# Patient Record
Sex: MALE | Race: WHITE | NOT HISPANIC OR LATINO | Employment: UNEMPLOYED | URBAN - METROPOLITAN AREA
[De-identification: names, ages, dates, MRNs, and addresses within clinical notes are randomized per-mention and may not be internally consistent; named-entity substitution may affect disease eponyms.]

---

## 2017-04-25 ENCOUNTER — COMMUNICATION - RIVER FALLS (OUTPATIENT)
Dept: FAMILY MEDICINE | Facility: CLINIC | Age: 57
End: 2017-04-25

## 2017-04-25 ENCOUNTER — OFFICE VISIT - RIVER FALLS (OUTPATIENT)
Dept: FAMILY MEDICINE | Facility: CLINIC | Age: 57
End: 2017-04-25

## 2017-04-25 ASSESSMENT — MIFFLIN-ST. JEOR: SCORE: 1680.36

## 2017-04-26 LAB
CREAT SERPL-MCNC: 1.04 MG/DL (ref 0.7–1.33)
GLUCOSE BLD-MCNC: 102 MG/DL (ref 65–99)

## 2017-06-06 ENCOUNTER — OFFICE VISIT - RIVER FALLS (OUTPATIENT)
Dept: FAMILY MEDICINE | Facility: CLINIC | Age: 57
End: 2017-06-06

## 2017-06-13 ENCOUNTER — OFFICE VISIT - RIVER FALLS (OUTPATIENT)
Dept: FAMILY MEDICINE | Facility: CLINIC | Age: 57
End: 2017-06-13

## 2017-06-13 ASSESSMENT — MIFFLIN-ST. JEOR: SCORE: 1680.36

## 2018-09-12 ENCOUNTER — OFFICE VISIT - RIVER FALLS (OUTPATIENT)
Dept: FAMILY MEDICINE | Facility: CLINIC | Age: 58
End: 2018-09-12

## 2018-09-12 ENCOUNTER — COMMUNICATION - RIVER FALLS (OUTPATIENT)
Dept: FAMILY MEDICINE | Facility: CLINIC | Age: 58
End: 2018-09-12

## 2018-09-12 ASSESSMENT — MIFFLIN-ST. JEOR: SCORE: 1699.41

## 2018-09-13 LAB
CHOLEST SERPL-MCNC: 237 MG/DL
CHOLEST/HDLC SERPL: 4.9 {RATIO}
CREAT SERPL-MCNC: 0.92 MG/DL (ref 0.7–1.33)
GLUCOSE BLD-MCNC: 108 MG/DL (ref 65–99)
HDLC SERPL-MCNC: 48 MG/DL
LDLC SERPL CALC-MCNC: 161 MG/DL
NONHDLC SERPL-MCNC: 189 MG/DL
TRIGL SERPL-MCNC: 146 MG/DL

## 2018-09-21 ENCOUNTER — OFFICE VISIT - RIVER FALLS (OUTPATIENT)
Dept: FAMILY MEDICINE | Facility: CLINIC | Age: 58
End: 2018-09-21

## 2019-02-05 ENCOUNTER — AMBULATORY - RIVER FALLS (OUTPATIENT)
Dept: FAMILY MEDICINE | Facility: CLINIC | Age: 59
End: 2019-02-05

## 2019-08-09 ENCOUNTER — HOSPITAL ENCOUNTER (INPATIENT)
Facility: CLINIC | Age: 59
LOS: 12 days | Discharge: PSYCHIATRIC OR SUBSTANCE USE FACILITY | End: 2019-08-21
Attending: PSYCHIATRY & NEUROLOGY | Admitting: PSYCHIATRY & NEUROLOGY
Payer: MEDICAID

## 2019-08-09 DIAGNOSIS — E56.9 VITAMIN DEFICIENCY: ICD-10-CM

## 2019-08-09 DIAGNOSIS — F33.2 SEVERE EPISODE OF RECURRENT MAJOR DEPRESSIVE DISORDER, WITHOUT PSYCHOTIC FEATURES (H): ICD-10-CM

## 2019-08-09 DIAGNOSIS — F17.200 NICOTINE DEPENDENCE, UNCOMPLICATED, UNSPECIFIED NICOTINE PRODUCT TYPE: ICD-10-CM

## 2019-08-09 DIAGNOSIS — F32.3 CURRENT SEVERE EPISODE OF MAJOR DEPRESSIVE DISORDER WITH PSYCHOTIC FEATURES, UNSPECIFIED WHETHER RECURRENT (H): ICD-10-CM

## 2019-08-09 DIAGNOSIS — K21.9 GASTROESOPHAGEAL REFLUX DISEASE, ESOPHAGITIS PRESENCE NOT SPECIFIED: Primary | ICD-10-CM

## 2019-08-09 DIAGNOSIS — F10.20 UNCOMPLICATED ALCOHOL DEPENDENCE (H): ICD-10-CM

## 2019-08-09 PROBLEM — F32.A DEPRESSION: Status: ACTIVE | Noted: 2019-08-09

## 2019-08-09 LAB
AMPHETAMINES UR QL SCN: NEGATIVE
BARBITURATES UR QL: NEGATIVE
BENZODIAZ UR QL: NEGATIVE
CANNABINOIDS UR QL SCN: NEGATIVE
COCAINE UR QL: NEGATIVE
ETHANOL UR QL SCN: NEGATIVE
OPIATES UR QL SCN: NEGATIVE

## 2019-08-09 PROCEDURE — 99285 EMERGENCY DEPT VISIT HI MDM: CPT | Mod: Z6 | Performed by: PSYCHIATRY & NEUROLOGY

## 2019-08-09 PROCEDURE — 99285 EMERGENCY DEPT VISIT HI MDM: CPT | Mod: 25 | Performed by: PSYCHIATRY & NEUROLOGY

## 2019-08-09 PROCEDURE — 80307 DRUG TEST PRSMV CHEM ANLYZR: CPT | Performed by: PSYCHIATRY & NEUROLOGY

## 2019-08-09 PROCEDURE — 25000132 ZZH RX MED GY IP 250 OP 250 PS 637: Performed by: EMERGENCY MEDICINE

## 2019-08-09 PROCEDURE — 12400002 ZZH R&B MH SENIOR/ADOLESCENT

## 2019-08-09 PROCEDURE — 80320 DRUG SCREEN QUANTALCOHOLS: CPT | Performed by: PSYCHIATRY & NEUROLOGY

## 2019-08-09 PROCEDURE — 90791 PSYCH DIAGNOSTIC EVALUATION: CPT

## 2019-08-09 RX ORDER — CITALOPRAM HYDROBROMIDE 40 MG/1
40 TABLET ORAL DAILY
Status: DISCONTINUED | OUTPATIENT
Start: 2019-08-10 | End: 2019-08-21 | Stop reason: HOSPADM

## 2019-08-09 RX ORDER — QUETIAPINE FUMARATE 50 MG/1
50 TABLET, FILM COATED ORAL 2 TIMES DAILY
Status: DISCONTINUED | OUTPATIENT
Start: 2019-08-10 | End: 2019-08-10

## 2019-08-09 RX ORDER — MIRTAZAPINE 15 MG/1
15 TABLET, FILM COATED ORAL AT BEDTIME
Status: DISCONTINUED | OUTPATIENT
Start: 2019-08-10 | End: 2019-08-12

## 2019-08-09 RX ORDER — QUETIAPINE FUMARATE 50 MG/1
50 TABLET, FILM COATED ORAL 2 TIMES DAILY
Status: ON HOLD | COMMUNITY
End: 2019-08-19

## 2019-08-09 RX ORDER — CITALOPRAM HYDROBROMIDE 10 MG/1
40 TABLET ORAL DAILY
Status: ON HOLD | COMMUNITY
End: 2019-08-10

## 2019-08-09 RX ORDER — QUETIAPINE FUMARATE 100 MG/1
100 TABLET, FILM COATED ORAL DAILY
Status: DISCONTINUED | OUTPATIENT
Start: 2019-08-10 | End: 2019-08-10

## 2019-08-09 RX ORDER — QUETIAPINE FUMARATE 100 MG/1
100 TABLET, FILM COATED ORAL ONCE
Status: COMPLETED | OUTPATIENT
Start: 2019-08-09 | End: 2019-08-09

## 2019-08-09 RX ORDER — NICOTINE 21 MG/24HR
1 PATCH, TRANSDERMAL 24 HOURS TRANSDERMAL DAILY
Status: DISCONTINUED | OUTPATIENT
Start: 2019-08-10 | End: 2019-08-21 | Stop reason: HOSPADM

## 2019-08-09 RX ORDER — QUETIAPINE FUMARATE 100 MG/1
100 TABLET, FILM COATED ORAL DAILY
Status: ON HOLD | COMMUNITY
End: 2019-08-19

## 2019-08-09 RX ORDER — HYDROXYZINE HYDROCHLORIDE 25 MG/1
25 TABLET, FILM COATED ORAL EVERY 4 HOURS PRN
Status: DISCONTINUED | OUTPATIENT
Start: 2019-08-09 | End: 2019-08-21 | Stop reason: HOSPADM

## 2019-08-09 RX ORDER — MIRTAZAPINE 15 MG/1
15 TABLET, FILM COATED ORAL AT BEDTIME
COMMUNITY

## 2019-08-09 RX ADMIN — QUETIAPINE FUMARATE 100 MG: 100 TABLET ORAL at 13:27

## 2019-08-09 ASSESSMENT — ACTIVITIES OF DAILY LIVING (ADL)
DRESS: INDEPENDENT
ORAL_HYGIENE: INDEPENDENT
HYGIENE/GROOMING: INDEPENDENT

## 2019-08-09 ASSESSMENT — ENCOUNTER SYMPTOMS
NERVOUS/ANXIOUS: 1
FEVER: 0
ABDOMINAL PAIN: 0
HALLUCINATIONS: 1
SHORTNESS OF BREATH: 0
DYSPHORIC MOOD: 1

## 2019-08-09 ASSESSMENT — MIFFLIN-ST. JEOR: SCORE: 1688.55

## 2019-08-09 NOTE — ED NOTES
I have performed an in person assessment of the patient. Based on this assessment the patient no longer requires a one on one attendant at this point in time.    Justin Munoz,   1:25 PM  August 9, 2019         Justin Munoz,   08/09/19 [id number]

## 2019-08-09 NOTE — ED PROVIDER NOTES
History     Chief Complaint   Patient presents with     Suicidal     Pt states that he is suicidal. He was at [facility] for alcohol. They sent him here by cab. Pt states that he feels like spiders and mice have been crawling on him for over a month     The history is provided by the patient and medical records.     Aldo Vernon is a 58 year old male who comes in due to his worsening mood and hallucinations.  He has a history of alcohol dependence. He has not used since 7/17/19 when he was hospitalized at [facility]. He was there for 17 days and then went to [facility] for CD treatment. He has stayed sober during this time.  He states he is feeling bugs crawl on him.  This is a chronic hallucination. He states he feels hopeless, helpless and worthless.  He has suicidal thoughts to overdose on alcohol/drugs or [place].  He does not feel safe. He is tearful.  He also states that he does not like [facility] and wants a different place to go. He thinks he needs an IRTS.  In the past, he has been to the ED, hospitalized and CD treatment programs multiple times.      Please see the 's assessment in EPIC from today (8/9/19) for further details.    I have reviewed the Medications, Allergies, Past Medical and Surgical History, and Social History in the Epic system.    Review of Systems   Constitutional: Negative for fever.   Respiratory: Negative for shortness of breath.    Cardiovascular: Negative for chest pain.   Gastrointestinal: Negative for abdominal pain.   Psychiatric/Behavioral: Positive for dysphoric mood, hallucinations and suicidal ideas. Negative for self-injury. The patient is nervous/anxious.    All other systems reviewed and are negative.      Physical Exam   BP: (!) 144/84  Pulse: 67  Temp: 98.7  F (37.1  C)  Resp: 16  Weight: 89.4 kg (197 lb)  SpO2: 97 %      Physical Exam   Constitutional: He appears well-developed and well-nourished.   Cardiovascular: Normal rate,  regular rhythm and normal heart sounds.   Pulmonary/Chest: Effort normal and breath sounds normal. No respiratory distress.   Psychiatric: His speech is normal and behavior is normal. Judgment normal. His mood appears anxious. He is actively hallucinating. Thought content is not paranoid and not delusional. Cognition and memory are normal. He exhibits a depressed mood. He expresses suicidal ideation. He expresses no homicidal ideation. He expresses suicidal plans. He expresses no homicidal plans.   Aldo is a 59 y/o male who looks his age. He is well groomed with good eye contact.     Nursing note and vitals reviewed.      ED Course        Procedures               Labs Ordered and Resulted from Time of ED Arrival Up to the Time of Departure from the ED   DRUG ABUSE SCREEN 6 CHEM DEP URINE ([facility])            Assessments & Plan (with Medical Decision Making)   Aldo will be admitted to the hospital due to his worsening depression, hopelessness, hallucinations, suicidal thoughts with a plan and the inability to be safe.  There are some red flags of some secondary gain due to him not liking where he is staying and his history of multiple hospitalizations.  At this point, there is no documentation of this, so he will be given the benefit of the doubt.  He will go to station 3b under Dr. Kerns.    I have reviewed the nursing notes.    I have reviewed the findings, diagnosis, plan and need for follow up with the patient.    New Prescriptions    No medications on file       Final diagnoses:   Current severe episode of major depressive disorder with psychotic features, unspecified whether recurrent (H)   Uncomplicated alcohol dependence (H)       8/9/2019   [facility], [place], EMERGENCY DEPARTMENT     Jesus Mehta MD  08/09/19 1945
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
## 2019-08-09 NOTE — ED NOTES
ED to Behavioral Floor Handoff    SITUATION  Aldo Vernon is a 58 year old male who speaks English and lives in a shelter with others The patient arrived in the ED by cab from home with a complaint of Suicidal (Pt states that he is suicidal. He was at [facility] for alcohol. They sent him here by cab. Pt states that he feels like spiders and mice have been crawling on him for over a month)  .The patient's current symptoms started/worsened 1 month(s) ago and during this time the symptoms have increased.   In the ED, pt was diagnosed with   Final diagnoses:   Current severe episode of major depressive disorder with psychotic features, unspecified whether recurrent (H)   Uncomplicated alcohol dependence (H)        Initial vitals were: BP: (!) 144/84  Pulse: 67  Temp: 98.7  F (37.1  C)  Resp: 16  Weight: 89.4 kg (197 lb)  SpO2: 97 %   --------  Is the patient diabetic? No   If yes, last blood glucose? --     If yes, was this treated in the ED? --  --------  Is the patient inebriated (ETOH) No or Impaired on other substances? No  MSSA done? N/A  Last MSSA score: --    Were withdrawal symptoms treated? N/A  Does the patient have a seizure history? No. If yes, date of most recent seizure--  --------  Is the patient patient experiencing suicidal ideation? reports suicidal ideation with out intention or a suicidal plan    Homicidal ideation? denies current or recent homicidal ideation or behaviors.    Self-injurious behavior/urges? denies current or recent self injurious behavior or ideation.  ------  Was pt aggressive in the ED No  Was a code called No  Is the pt now cooperative? Yes  -------  Meds given in ED:   Medications   QUEtiapine (SEROquel) tablet 100 mg (100 mg Oral Given 8/9/19 2230)      Family present during ED course? No  Family currently present? No    BACKGROUND  Does the patient have a cognitive impairment or developmental disability? No  Allergies: No Known Allergies.   Social demographics are    Social History     Socioeconomic History     Marital status:      Spouse name: None     Number of children: None     Years of education: None     Highest education level: None   Occupational History     None   Social Needs     Financial resource strain: None     Food insecurity:     Worry: None     Inability: None     Transportation needs:     Medical: None     Non-medical: None   Tobacco Use     Smoking status: Current Every Day Smoker     Smokeless tobacco: Never Used   Substance and Sexual Activity     Alcohol use: Not Currently     Comment: last drink July 17     Drug use: Not Currently     Sexual activity: None   Lifestyle     Physical activity:     Days per week: None     Minutes per session: None     Stress: None   Relationships     Social connections:     Talks on phone: None     Gets together: None     Attends Holiness service: None     Active member of club or organization: None     Attends meetings of clubs or organizations: None     Relationship status: None     Intimate partner violence:     Fear of current or ex partner: None     Emotionally abused: None     Physically abused: None     Forced sexual activity: None   Other Topics Concern     None   Social History Narrative     None        ASSESSMENT  Labs results   Labs Ordered and Resulted from Time of ED Arrival Up to the Time of Departure from the ED   DRUG ABUSE SCREEN 6 CHEM DEP URINE ([facility])      Imaging Studies: No results found for this or any previous visit (from the past 24 hour(s)).   Most recent vital signs BP (!) 144/84   Pulse 67   Temp 98.7  F (37.1  C) (Oral)   Resp 16   Wt 89.4 kg (197 lb)   SpO2 97%    Abnormal labs/tests/findings requiring intervention:---   Pain control: good  Nausea control: good    RECOMMENDATION  Are any infection precautions needed (MRSA, VRE, etc.)? No If yes, what infection? --  ---  Does the patient have mobility issues? independently. If yes, what device does the pt use? ---  ---  Is patient on  72 hour hold or commitment? No If on 72 hour hold, have hold and rights been given to patient? N/A  Are admitting orders written if after 10 p.m. ?N/A  Tasks needing to be completed:---     Khloe pryor-- [phone] [facility]   [phone] [facility]

## 2019-08-10 LAB
ALBUMIN SERPL-MCNC: 3.9 G/DL (ref 3.4–5)
ALP SERPL-CCNC: 87 U/L (ref 40–150)
ALT SERPL W P-5'-P-CCNC: 72 U/L (ref 0–70)
ANION GAP SERPL CALCULATED.3IONS-SCNC: 8 MMOL/L (ref 3–14)
AST SERPL W P-5'-P-CCNC: 28 U/L (ref 0–45)
BILIRUB SERPL-MCNC: 0.7 MG/DL (ref 0.2–1.3)
BUN SERPL-MCNC: 14 MG/DL (ref 7–30)
CALCIUM SERPL-MCNC: 9 MG/DL (ref 8.5–10.1)
CHLORIDE SERPL-SCNC: 109 MMOL/L (ref 94–109)
CO2 SERPL-SCNC: 22 MMOL/L (ref 20–32)
CREAT SERPL-MCNC: 0.88 MG/DL (ref 0.66–1.25)
ERYTHROCYTE [DISTWIDTH] IN BLOOD BY AUTOMATED COUNT: 12.2 % (ref 10–15)
GFR SERPL CREATININE-BSD FRML MDRD: >90 ML/MIN/{1.73_M2}
GLUCOSE SERPL-MCNC: 103 MG/DL (ref 70–99)
HCT VFR BLD AUTO: 44.8 % (ref 40–53)
HGB BLD-MCNC: 15.3 G/DL (ref 13.3–17.7)
MCH RBC QN AUTO: 31.7 PG (ref 26.5–33)
MCHC RBC AUTO-ENTMCNC: 34.2 G/DL (ref 31.5–36.5)
MCV RBC AUTO: 93 FL (ref 78–100)
PLATELET # BLD AUTO: 287 10E9/L (ref 150–450)
POTASSIUM SERPL-SCNC: 4.1 MMOL/L (ref 3.4–5.3)
PROT SERPL-MCNC: 7.5 G/DL (ref 6.8–8.8)
RBC # BLD AUTO: 4.83 10E12/L (ref 4.4–5.9)
SODIUM SERPL-SCNC: 139 MMOL/L (ref 133–144)
WBC # BLD AUTO: 5.8 10E9/L (ref 4–11)

## 2019-08-10 PROCEDURE — 99222 1ST HOSP IP/OBS MODERATE 55: CPT | Mod: AI | Performed by: PSYCHIATRY & NEUROLOGY

## 2019-08-10 PROCEDURE — 99222 1ST HOSP IP/OBS MODERATE 55: CPT | Performed by: PHYSICIAN ASSISTANT

## 2019-08-10 PROCEDURE — 12400002 ZZH R&B MH SENIOR/ADOLESCENT

## 2019-08-10 PROCEDURE — 25000132 ZZH RX MED GY IP 250 OP 250 PS 637: Performed by: PSYCHIATRY & NEUROLOGY

## 2019-08-10 PROCEDURE — 80053 COMPREHEN METABOLIC PANEL: CPT | Performed by: PHYSICIAN ASSISTANT

## 2019-08-10 PROCEDURE — 85027 COMPLETE CBC AUTOMATED: CPT | Performed by: PHYSICIAN ASSISTANT

## 2019-08-10 PROCEDURE — 36415 COLL VENOUS BLD VENIPUNCTURE: CPT | Performed by: PHYSICIAN ASSISTANT

## 2019-08-10 PROCEDURE — 99207 ZZC CONSULT E&M CHANGED TO INITIAL LEVEL: CPT | Performed by: PHYSICIAN ASSISTANT

## 2019-08-10 RX ORDER — ACETAMINOPHEN 325 MG/1
650 TABLET ORAL EVERY 4 HOURS PRN
Status: DISCONTINUED | OUTPATIENT
Start: 2019-08-10 | End: 2019-08-21 | Stop reason: HOSPADM

## 2019-08-10 RX ORDER — CITALOPRAM HYDROBROMIDE 40 MG/1
40 TABLET ORAL EVERY MORNING
Status: ON HOLD | COMMUNITY
End: 2019-08-19

## 2019-08-10 RX ORDER — QUETIAPINE FUMARATE 100 MG/1
100 TABLET, FILM COATED ORAL 3 TIMES DAILY
Status: DISCONTINUED | OUTPATIENT
Start: 2019-08-10 | End: 2019-08-13

## 2019-08-10 RX ADMIN — CITALOPRAM HYDROBROMIDE 40 MG: 40 TABLET ORAL at 08:57

## 2019-08-10 RX ADMIN — MIRTAZAPINE 15 MG: 15 TABLET, FILM COATED ORAL at 20:27

## 2019-08-10 RX ADMIN — QUETIAPINE FUMARATE 50 MG: 50 TABLET ORAL at 00:01

## 2019-08-10 RX ADMIN — QUETIAPINE FUMARATE 100 MG: 100 TABLET ORAL at 19:50

## 2019-08-10 RX ADMIN — QUETIAPINE FUMARATE 100 MG: 100 TABLET ORAL at 13:34

## 2019-08-10 RX ADMIN — MIRTAZAPINE 15 MG: 15 TABLET, FILM COATED ORAL at 00:00

## 2019-08-10 RX ADMIN — ACETAMINOPHEN 650 MG: 325 TABLET, FILM COATED ORAL at 12:26

## 2019-08-10 RX ADMIN — HYDROXYZINE HYDROCHLORIDE 25 MG: 25 TABLET ORAL at 10:56

## 2019-08-10 RX ADMIN — MELATONIN 2000 UNITS: at 08:57

## 2019-08-10 RX ADMIN — NICOTINE 1 PATCH: 21 PATCH, EXTENDED RELEASE TRANSDERMAL at 08:57

## 2019-08-10 RX ADMIN — QUETIAPINE FUMARATE 50 MG: 50 TABLET ORAL at 08:58

## 2019-08-10 ASSESSMENT — ACTIVITIES OF DAILY LIVING (ADL)
ORAL_HYGIENE: INDEPENDENT
ORAL_HYGIENE: INDEPENDENT
AMBULATION: 0-->INDEPENDENT
BATHING: 0-->INDEPENDENT
SWALLOWING: 0-->SWALLOWS FOODS/LIQUIDS WITHOUT DIFFICULTY
COGNITION: 0 - NO COGNITION ISSUES REPORTED
RETIRED_EATING: 0-->INDEPENDENT
TRANSFERRING: 0-->INDEPENDENT
DRESS: INDEPENDENT
DRESS: 0-->INDEPENDENT
NUMBER_OF_TIMES_PATIENT_HAS_FALLEN_WITHIN_LAST_SIX_MONTHS: 1
FALL_HISTORY_WITHIN_LAST_SIX_MONTHS: YES
RETIRED_COMMUNICATION: 0-->UNDERSTANDS/COMMUNICATES WITHOUT DIFFICULTY
LAUNDRY: WITH SUPERVISION
TOILETING: 0-->INDEPENDENT
HYGIENE/GROOMING: INDEPENDENT
DRESS: INDEPENDENT
HYGIENE/GROOMING: INDEPENDENT

## 2019-08-10 NOTE — PHARMACY-ADMISSION MEDICATION HISTORY
Admission medication history for the August 10, 2019 admission is complete.     Interview sources:  Patient, Care Everywhere - [facility]    Reliability of source: Good - patient was a reliable historian of his medications stating medication name, dosing instructions, and approximate last dose of his medications.      Medication compliance: Good - patient reports taking his medications daily as directed.  Per patient he misses approximately 2-3 dose of medication per month.     Changes made to PTA medication list (reason)  Added: None.     Deleted: None.     Changed: Per patient and Care Everywhere, the following medication have been updated to include changes in formulation:  Citalopram 10 mg tablet -- take 40 mg by mouth daily -------changed to----> Citalopram 40 mg tablet -- take 1 tablet (40 mg) by mouth every morning.     Additional medication history information:   This medication history was completed at the patients bedside on 3B.  Patient reports no known food or medication allergies.  Patient shared he currently smokes 1/2 pack of cigarettes daily at home.  Patient would like access to nicotine patches during his inpatient stay, but shared he would prefer not to receive nicotine gum or lozenges due to taste.     --For the mirtazapine, patient reports taking 30 mg at bedtime rather than 15 mg as directed.   Unable to verify dose increase from 15 mg to 30 mg in patient chart, dispense history, or with Care Everywhere.     --For the ranitidine, is currently taking ranitidine PRN for heartburn symptoms.  Per patient, he experiences symptoms of heartburn 3-4 times per week over night.  Patient is open to taking a medication daily for heartburn prevention.     The patient denies currently taking any additional prescription, OTC or herbal medications at home.     Prior to Admission medications    Medication Sig Last Dose Taking? Auth Provider   cholecalciferol (VITAMIN D3) 1000 units (25 mcg) capsule Take 2  capsules by mouth daily 8/9/2019 at AM Yes Reported, Patient   citalopram (CELEXA) 40 MG tablet Take 40 mg by mouth every morning 8/9/2019 at AM Yes Unknown, Entered By History   mirtazapine (REMERON) 15 MG tablet Take 15 mg by mouth At Bedtime Past Week at Unknown time Yes Reported, Patient   QUEtiapine (SEROQUEL) 100 MG tablet Take 100 mg by mouth daily at noon. 8/9/2019 at 1200 Yes Reported, Patient   QUEtiapine (SEROQUEL) 50 MG tablet Take 50 mg by mouth 2 times daily after breakfast and dinner. 8/9/2019 at AM Yes Reported, Patient   ranitidine (ZANTAC) 150 MG tablet Take 150 mg by mouth 2 times daily as needed for heartburn Past Week at PRN Yes Reported, Patient       Time spent: 30 minutes    Medication history completed by: Heidi Garcia, Pharmacy Intern

## 2019-08-10 NOTE — PROGRESS NOTES
08/09/19 2242   Patient Belongings   Did you bring any home meds/supplements to the hospital?  Yes   Disposition of meds  Other (see comment)   Patient Belongings locker   Patient Belongings Put in Hospital Secure Location (Security or Locker, etc.) other (see comments)   Belongings Search Yes   Clothing Search Yes   Second Staff GENEVA Caraballo             For patient locker: one pair shoes, one belt, one pack cigs., one cell phone, one black bag, one large suitcase, one bible, assorted paperwork, one necklace, one glasses case with glassed, one phone , ear buds, one small metal case, one pair cowboy boots, assorted toiltries, one bollow,   To security: [id number] one social security card, one [place] drivers license, two master card, one visa, one [place] EBT card.  Admission:  I am responsible for any personal items that are not sent to the safe or pharmacy.  [facility] is not responsible for loss, theft or damage of any property in my possession.    Signature:  _________________________________ Date: _______  Time: _____                                              Staff Signature:  ____________________________ Date: ________  Time: _____      2nd Staff person, if patient is unable/unwilling to sign:    Signature: ________________________________ Date: ________  Time: _____     Discharge:  [facility] has returned all of my personal belongings:    Signature: _________________________________ Date: ________  Time: _____                                          Staff Signature:  ____________________________ Date: ________  Time: _____
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
## 2019-08-10 NOTE — PROGRESS NOTES
"Approached  pt to complete admission profile, pt politely declined to be interviewed stating that he was tired and has waited 11 hours in the ED, \" I think it would be fair to let me rest. I will try not to be a pain in the ass\".  Med compliant.  "
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
## 2019-08-10 NOTE — PROGRESS NOTES
Visited with pt on the basis of hospital  requested for spiritual support of pt.  Reflected with pt around his hospital experience, sources of spiritual and emotional support and current spiritual health needs. Pt talked about his current situation and what it means for him. This admission I offered prayer, healing and blessings for the pt.    Emotional support. Reflective conversation integrating illness elements and family spiritual narratives.  I shared conversation that would invite God into the room and to bless him, support him in his suffering. I provided special prayer asking God to help and ease and eliminate any suffering and pain that the pt feels.    Pt received spiritual support and reflective conversation in the context of this hospitalization.  Pt expressed appreciation for the visit.    I will inform unit  for follow-up for spiritual support.
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
## 2019-08-10 NOTE — PROGRESS NOTES
Admit from the [facility] .  Oriented, calm, sad.  Rates depression and anxiety at 10/10 and still has suicidal thoughts with plan of car accident outside the hospital but no intent while in the hospital.  States that his appetite has been OK.  Fall history in the past 6 months.    P:  Continue to monitor.
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
## 2019-08-11 PROCEDURE — 25000132 ZZH RX MED GY IP 250 OP 250 PS 637: Performed by: PSYCHIATRY & NEUROLOGY

## 2019-08-11 PROCEDURE — 12400002 ZZH R&B MH SENIOR/ADOLESCENT

## 2019-08-11 RX ADMIN — CITALOPRAM HYDROBROMIDE 40 MG: 40 TABLET ORAL at 08:55

## 2019-08-11 RX ADMIN — QUETIAPINE FUMARATE 100 MG: 100 TABLET ORAL at 20:04

## 2019-08-11 RX ADMIN — MELATONIN 2000 UNITS: at 08:55

## 2019-08-11 RX ADMIN — IBUPROFEN 600 MG: 400 TABLET ORAL at 08:55

## 2019-08-11 RX ADMIN — QUETIAPINE FUMARATE 100 MG: 100 TABLET ORAL at 08:55

## 2019-08-11 RX ADMIN — NICOTINE 1 PATCH: 21 PATCH, EXTENDED RELEASE TRANSDERMAL at 08:57

## 2019-08-11 RX ADMIN — QUETIAPINE FUMARATE 100 MG: 100 TABLET ORAL at 14:32

## 2019-08-11 RX ADMIN — ACETAMINOPHEN 650 MG: 325 TABLET, FILM COATED ORAL at 10:16

## 2019-08-11 RX ADMIN — MIRTAZAPINE 15 MG: 15 TABLET, FILM COATED ORAL at 20:04

## 2019-08-11 ASSESSMENT — ACTIVITIES OF DAILY LIVING (ADL)
DRESS: SCRUBS (BEHAVIORAL HEALTH);INDEPENDENT
ORAL_HYGIENE: INDEPENDENT
HYGIENE/GROOMING: INDEPENDENT
ORAL_HYGIENE: INDEPENDENT
DRESS: SCRUBS (BEHAVIORAL HEALTH);STREET CLOTHES
HYGIENE/GROOMING: INDEPENDENT
LAUNDRY: WITH SUPERVISION

## 2019-08-11 NOTE — PROGRESS NOTES
Rates depression and anxiety at 8.5/10 today.  Still appears very sad.  Denies any tactile hallucinations tonight.  Visible in the lounge but does not interact with peers.

## 2019-08-11 NOTE — PLAN OF CARE
"48 hour nursing assessment  Problem: Depressive Symptoms  Goal: Depressive Symptoms  Description  Signs and symptoms of listed problems will be absent or manageable.  Outcome: No Change  Note:   Endorsed feeling anxious and depressed related to anticipating when he will receive divorce paperwork - he stated he would sometime this afternoon. Patient did not expand on this and stated, \"I'll talk to the doctor about it.\" Endorsed having back pain and utilized ibuprofen and tylenol with some relief.      Problem: Depressive Symptoms  Goal: Social and Therapeutic (Depression)  Description  Signs and symptoms of listed problems will be absent or manageable.  Outcome: No Change  Note:   Patient is visible in the milieu throughout the morning and was seen in groups. Withdrawn to himself.      Problem: Suicidal Behavior  Goal: Suicidal Behavior is Absent or Managed  Outcome: Declining  Note:   Affect is flat. Appears guarded in interaction. Poor eye contact with writer. Patient endorses having suicidal ideation with a plan, however does not tell writer and states, \"I'll talk to the doctor about it.\" He repeated this statement a couple times in writer's interaction with him. Writer informed patient that our goal is to keep him safe on the unit and that staff ask these questions as they are important to ask. He denied having any plan or intent to hurt or kill himself while on the unit/in the hospital. Listened actively to patient and encouraged patient to verbalize questions or concerns.    Addendum: Patient's wife came to the unit and patient agreed to have her on the unit and sign divorce paperwork. Writer offered to be present with patient, and he declined. He spends time out in the lounge/dining room afterwards.  "

## 2019-08-12 PROCEDURE — 25000132 ZZH RX MED GY IP 250 OP 250 PS 637: Performed by: PSYCHIATRY & NEUROLOGY

## 2019-08-12 PROCEDURE — G0177 OPPS/PHP; TRAIN & EDUC SERV: HCPCS

## 2019-08-12 PROCEDURE — 99232 SBSQ HOSP IP/OBS MODERATE 35: CPT | Performed by: PSYCHIATRY & NEUROLOGY

## 2019-08-12 PROCEDURE — 12400002 ZZH R&B MH SENIOR/ADOLESCENT

## 2019-08-12 RX ORDER — MIRTAZAPINE 30 MG/1
30 TABLET, FILM COATED ORAL AT BEDTIME
Status: DISCONTINUED | OUTPATIENT
Start: 2019-08-12 | End: 2019-08-21 | Stop reason: HOSPADM

## 2019-08-12 RX ADMIN — QUETIAPINE FUMARATE 100 MG: 100 TABLET ORAL at 13:22

## 2019-08-12 RX ADMIN — QUETIAPINE FUMARATE 100 MG: 100 TABLET ORAL at 20:11

## 2019-08-12 RX ADMIN — NICOTINE 1 PATCH: 21 PATCH, EXTENDED RELEASE TRANSDERMAL at 08:31

## 2019-08-12 RX ADMIN — MELATONIN 2000 UNITS: at 08:31

## 2019-08-12 RX ADMIN — CITALOPRAM HYDROBROMIDE 40 MG: 40 TABLET ORAL at 08:31

## 2019-08-12 RX ADMIN — QUETIAPINE FUMARATE 100 MG: 100 TABLET ORAL at 08:31

## 2019-08-12 RX ADMIN — MIRTAZAPINE 30 MG: 30 TABLET, FILM COATED ORAL at 20:11

## 2019-08-12 ASSESSMENT — ACTIVITIES OF DAILY LIVING (ADL)
HYGIENE/GROOMING: INDEPENDENT
DRESS: SCRUBS (BEHAVIORAL HEALTH)
ORAL_HYGIENE: INDEPENDENT
ORAL_HYGIENE: INDEPENDENT
DRESS: SCRUBS (BEHAVIORAL HEALTH)
LAUNDRY: WITH SUPERVISION
HYGIENE/GROOMING: INDEPENDENT

## 2019-08-12 NOTE — PROGRESS NOTES
08/11/19 2200   General Information   Art Directive other (see comments)   AT directive was to create an image of a safe place and to identify five items within safe place that represent each of the five senses. Goals of directive: trauma containment, emotional expression. Pt was a quiet participant, focused on task for the full duration of group. Pt finished drawing and briefly shared with group. Pt created an imaginary safe place inspired by a cabin he had once visited. Pt brandon an interior of a log cabin with a fireplace, chair, books and a dog. Pt described the five senses within safe place. Pt was observed with a depressed mood, flat affect, though brightened in conversation about art.

## 2019-08-12 NOTE — PROGRESS NOTES
Pt reports that he was at [facility] three weeks ago where a Rule 25 was completed.  Attempted to contact mental health inpatient unit to ask for pt's Rule 25. They sent me to medical records which repeated the same message circularly and did not allow writer opportunity to leave voice message.    Pt lacks insurance at this time so unable to refer to CD treatment or IRTS at this time.

## 2019-08-12 NOTE — PROGRESS NOTES
"   08/12/19 1400   Behavioral Health   Hallucinations tactile   Thinking poor concentration;distractable   Orientation person: oriented;place: oriented;date: oriented;time: oriented   Memory baseline memory   Insight poor   Judgement impaired   Eye Contact at examiner  (eyes closed at times)   Affect blunted, flat   Mood depressed;irritable   Physical Appearance/Attire disheveled   Hygiene other (see comment)  (adequate)   Suicidality thoughts only   1. Wish to be Dead Yes   2. Non-Specific Active Suicidal Thoughts  No   Self Injury other (see comment)  (pt denies)   Elopement   (none noted)   Activity withdrawn;other (see comment)  (present in groups and milieu)   Speech clear;coherent;other (see comments)  (minimal responses)   Medication Sensitivity no stated side effects;no observed side effects   Psychomotor / Gait balanced;steady;slow   Coping/Psychosocial   Verbalized Emotional State depression   Plan of Care Reviewed With patient   Patient Agreement with Plan of Care agrees   Psycho Education   Type of Intervention 1:1 intervention   Response participates with encouragement   Hours 0.5   Treatment Detail check in   Group Therapy Session   Group Attendance attended group session   Safety   Suicidality Status 15;Optimize communication / relationship to minimize opportunity for self-harm;Promote patient engagement with treatment process;Identify and strengthen protective factors   Activities of Daily Living   Hygiene/Grooming independent   Oral Hygiene independent   Dress scrubs (behavioral health)   Laundry with supervision   Room Organization independent   Activity   Activity Assistance Provided independent       Patient was in room laying down when staff approached for check in. Patient stated that he did not sleep well last night and has not been sleeping well before that so \"i'm catching up on my sleep\". Patient attended morning groups and has been present in the lounge, but withdrawn from conversation and " "activity with peers/staff. Patient affect blunted/flat, mood is irritable upon check in. Patient endorses tactile hallucinations of bugs crawling, but stated they come and go throughout the day. Patient also stated that he has been having SI, but would not act on it while in the hospital. Patient agreeable to come to staff and speak with them if thoughts intensify or he has feelings to act on those thoughts. When asked about his plan for this stay he stated \"I just want to get the hlep that I need.\" when asked what that would look like patient said \"I don't know\". Pt cooperative with check in, but appeared irritated that staff checked in with him at that time. Pt A&Ox4.   "

## 2019-08-12 NOTE — PROGRESS NOTES
Spoke with Afia from the business office as it appears as though pt has no insurance.  Afia reports that pt has no insurance.  Someone will come up to complete MA application for pt.    Pt reports that he would like to go to an IRTS or CD treatment.  Doesn't matter to him which one.

## 2019-08-12 NOTE — PLAN OF CARE
"Initial Note:     Patient participated in OT group this morning focused on communication, frustration management, and awareness. A structured group activity was used to promote cohesion and participation and provide concrete experiential examples to draw from. Patient was attentive and initiated contribution. He spoke softly. His contributions were on track although he mumbled some things. He participated independently and his inflection and tone became stronger and more confident as group went on. Patient participated in conclusion discussion with insightful comments and realization. Patient affect was flat with limited eye contact (improved as group went on). Patient ended group saying \"That was fun, thank you.\" No hygiene concerns.  "

## 2019-08-12 NOTE — PLAN OF CARE
BEHAVIORAL TEAM DISCUSSION    Participants: DUKE Benjamin, DUKE Umana, Lila Do RN, Aldo Kerns MD  Progress: Minimal orly admitted  Continued Stay Criteria/Rationale: Needs evaluation for CD, medication management  Medical/Physical: None  Precautions:   Behavioral Orders   Procedures    Code 1 - Restrict to Unit    Routine Programming     As clinically indicated    Status 15     Every 15 minutes.    Suicide precautions     Patients on Suicide Precautions should have a Combination Diet ordered that includes a Diet selection(s) AND a Behavioral Tray selection for Safe Tray - with utensils, or Safe Tray - NO utensils       Plan: Medication Management, CD consult, [facility] will work on aftercare planning.  Rationale for change in precautions or plan: None

## 2019-08-12 NOTE — PROGRESS NOTES
8/12/2019    CD consult acknowledged. Due to limited availability, there is no time frame on CD assessment at current time. Pt may not be seen until Thursday 08/14. Please follow up with Malu Vergara [phone] or Cynthia Rushing [phone]  if there are further concerns    SERGE Griffin

## 2019-08-12 NOTE — PROGRESS NOTES
"Pt refused check-in, appeared sad and irritable, but otherwise clean and tidy. Had spent more time in milieu and in groups than previous days. Immediately before bed, he apologized for his abrupt and brisk behavior, stating he just signed his divorce papers today. He said he did not want to get . During groups, patient is quoted as saying \"I'm glad I'm here, I feel safe here, but if I were outside I would try to kill myself.\"       08/11/19 2042   Behavioral Health   Hallucinations   (refused)   Thinking   (refused)   Orientation   (refused)   Memory   (refused)   Insight   (refused)   Judgement   (refused)   Eye Contact at examiner   Affect blunted, flat   Mood depressed;irritable   Physical Appearance/Attire attire appropriate to age and situation   Hygiene well groomed   Suicidality   (refused to comment)   1. Wish to be Dead   (refused)   2. Non-Specific Active Suicidal Thoughts    (refused)   Elopement   (no risk)   Activity withdrawn;other (see comment)  (more active than usual)   Psychomotor / Gait balanced;steady;foot dragging;slow   Psycho Education   Type of Intervention 1:1 intervention   Response refuses   Safety   Suicidality Status 15   Activities of Daily Living   Hygiene/Grooming independent   Oral Hygiene independent   Dress scrubs (behavioral health);street clothes   Laundry with supervision   Room Organization independent   Activity   Activity Assistance Provided independent     "

## 2019-08-12 NOTE — PLAN OF CARE
Reasons you are in the hospital:  1)  Suicidal ideation  2)  Panic attack    Goals for Discharge:  1)  Go to treatment or an IRTS.

## 2019-08-12 NOTE — PROGRESS NOTES
"[facility], [place]   Psychiatric Progress Note        Interim History:   The patient's care was discussed with the treatment team during the daily team meeting and/or staff's chart notes were reviewed.  Staff report patient is cooperative. Ate breakfast, took morning medications.     The patient reports that he has \"never been this low.\" Wife did come and serve him divorce papers. Says that his suicidal thoughts \"are powerful at night.\" Does feel \"very safe here\" but would not feel safe outside of the hospital. Didn't sleep that well. Says that the increase in Seroquel has been helpful for the visual hallucinations. Does report that he is usually on 30mg of Remeron. Eating well.          Medications:       citalopram  40 mg Oral Daily     mirtazapine  30 mg Oral At Bedtime     nicotine  1 patch Transdermal Daily     nicotine   Transdermal Q8H     nicotine   Transdermal Daily     QUEtiapine  100 mg Oral TID     vitamin D3  2,000 Units Oral Daily          Allergies:   No Known Allergies       Labs:   No results found for this or any previous visit (from the past 24 hour(s)).       Psychiatric Examination:     /68   Pulse 67   Temp 97  F (36.1  C) (Oral)   Resp 16   Ht 1.727 m (5' 8\")   Wt 88.9 kg (196 lb)   SpO2 97%   BMI 29.80 kg/m    Weight is 196 lbs 0 oz  Body mass index is 29.8 kg/m .  Orthostatic Vitals       Most Recent      Sitting Orthostatic /54 08/11 1635    Sitting Orthostatic Pulse (bpm) 55 08/11 1635    Standing Orthostatic /69 08/12 0803    Standing Orthostatic Pulse (bpm) 76 08/12 0803            Appearance: awake, alert and adequately groomed  Attitude:  cooperative  Eye Contact:  good  Mood:  depressed  Affect:  mood congruent  Speech:  clear, coherent  Psychomotor Behavior:  no evidence of tardive dyskinesia, dystonia, or tics  Thought Process:  goal oriented  Associations:  no loose associations  Thought Content:  passive suicidal ideation " present, visual hallucinations present and improved  Insight:  fair  Judgement:  fair  Oriented to:  time, person, and place  Attention Span and Concentration:  intact  Recent and Remote Memory:  fair    Clinical Global Impressions  First:  Considering your total clinical experience with this particular patient population, how severe are the patient's symptoms at this time?: 7 (08/10/19 0649)  Compared to the patient's condition at the START of treatment, this patient's condition is:: 4 (08/10/19 0649)  Most recent:  Considering your total clinical experience with this particular patient population, how severe are the patient's symptoms at this time?: 7 (08/10/19 0649)  Compared to the patient's condition at the START of treatment, this patient's condition is:: 4 (08/10/19 0649)         Precautions:     Behavioral Orders   Procedures     Code 1 - Restrict to Unit     Routine Programming     As clinically indicated     Status 15     Every 15 minutes.     Suicide precautions     Patients on Suicide Precautions should have a Combination Diet ordered that includes a Diet selection(s) AND a Behavioral Tray selection for Safe Tray - with utensils, or Safe Tray - NO utensils            Diagnoses:     Current severe episode of major depressive disorder with psychotic features, unspecified whether recurrent (H)  Uncomplicated alcohol dependence (H)          Assessment & Plan:      1) Continue increased Seroquel 100mg TID.   2) Continue Celexa.   3) Increase Remeron to 15mg at bedtime.   4) Patient open to CD treatment at [facility] or an IR facility. Will place CD consult.         Disposition Plan   Reason for ongoing admission: poses an imminent risk to self  Discharge location: Chemical dependency treatment facility  Discharge Medications: not ordered  Follow-up Appointments: not scheduled  Legal Status: voluntary  Entered by: Aldo Kerns on 8/12/2019 at 11:53 AM

## 2019-08-13 PROCEDURE — 25000132 ZZH RX MED GY IP 250 OP 250 PS 637: Performed by: PSYCHIATRY & NEUROLOGY

## 2019-08-13 PROCEDURE — 12400002 ZZH R&B MH SENIOR/ADOLESCENT

## 2019-08-13 PROCEDURE — 99232 SBSQ HOSP IP/OBS MODERATE 35: CPT | Performed by: PSYCHIATRY & NEUROLOGY

## 2019-08-13 PROCEDURE — H2032 ACTIVITY THERAPY, PER 15 MIN: HCPCS

## 2019-08-13 PROCEDURE — G0177 OPPS/PHP; TRAIN & EDUC SERV: HCPCS

## 2019-08-13 RX ADMIN — NICOTINE 1 PATCH: 21 PATCH, EXTENDED RELEASE TRANSDERMAL at 08:31

## 2019-08-13 RX ADMIN — MIRTAZAPINE 30 MG: 30 TABLET, FILM COATED ORAL at 20:28

## 2019-08-13 RX ADMIN — MELATONIN 2000 UNITS: at 08:31

## 2019-08-13 RX ADMIN — CITALOPRAM HYDROBROMIDE 40 MG: 40 TABLET ORAL at 08:31

## 2019-08-13 RX ADMIN — QUETIAPINE FUMARATE 125 MG: 100 TABLET ORAL at 13:14

## 2019-08-13 RX ADMIN — ACETAMINOPHEN 650 MG: 325 TABLET, FILM COATED ORAL at 08:33

## 2019-08-13 RX ADMIN — QUETIAPINE FUMARATE 100 MG: 100 TABLET ORAL at 08:31

## 2019-08-13 RX ADMIN — QUETIAPINE FUMARATE 125 MG: 100 TABLET ORAL at 20:28

## 2019-08-13 ASSESSMENT — ACTIVITIES OF DAILY LIVING (ADL)
ORAL_HYGIENE: INDEPENDENT
DRESS: INDEPENDENT
LAUNDRY: WITH SUPERVISION
ORAL_HYGIENE: INDEPENDENT
HYGIENE/GROOMING: INDEPENDENT
HYGIENE/GROOMING: INDEPENDENT
DRESS: INDEPENDENT

## 2019-08-13 ASSESSMENT — MIFFLIN-ST. JEOR: SCORE: 1684.01

## 2019-08-13 NOTE — PLAN OF CARE
"Patient appeared calm, social and attending group activities. He declined to answer mental health questions stating \"I will talk to my doctor in the morning\". Patient is medication complaint.   "
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
## 2019-08-13 NOTE — PROGRESS NOTES
"   08/13/19 1100   General Information   Date Initially Attended OT 08/13/19   Clinical Impression   Affect Flat;Appropriate to situation   Orientation Oriented to person, place and time   Appearance and ADLs General cleanliness observed in most areas   Attention to Internal Stimuli No observed signs   Interaction Skills Initiates appropriately with staff;Interacts appropriately with peers   Ability to Communicate Needs Independent   Verbal Content Clear;Appropriate to topic   Ability to Maintain Boundaries Maintains appropriate physical boundaries;Maintains appropriate verbal boundaries   Participation Initiates participation   Concentration Concentrates 50 minutes   Ability to Concentrate With structure;Needs further assessment   Follows and Comprehends Directions Independently follows 2 step verbal directions   Memory Delayed and immediate recall intact   Organization Independently organizes medium tasks   Decision Making Independent   Planning and Problem Solving Independently plans ahead;Needs further assessment   Ability to Apply and Learn Concepts Applies within group structure   Frustrations / Stress Tolerance Independently identifies sources of frustration/stress   Level of Insight Insightful into needs, issues, goals;Needs further assessment   Self Esteem Can identify positives   Social Supports Has knowledge of support systems   General Observation/Plan   General Observations/Plan See Comments   Attended 2 of 2 OT groups. He took the initiative in requesting work on a familiar step though creative task he needed to do some planning and organizing on with the details of the design. He took time to plan. He was pleasant and social with others. He stated reason for admission as \"I have mental illness\". The personal strength he identified as \"I am the nicest man I know\". Changes he hopes for at time of discharge was \"a future\". He participated in an activity focused on identifying helpful affirmations to use. " He offered a couple additional ideas and explained his thoughts clearly. OT goals he chose were to express feelings better, improve concentration, problem solving and ask for help as needed. Pt was given and completed a written self assessment. OT purpose was explained with the value of having involvement in treatment plan, and provided options to meet self identified goals. Plan:  Provide structure, support, and encouragement. Offer education on coping strategies and life management skills. Assist pt to increase self awareness regarding mental health issues and expand network of support resources. Assess further.

## 2019-08-13 NOTE — PROGRESS NOTES
Attended OT group focused on emotions identification and expression. Sullen/depressed mood and affect. Downcast eyes entire session. Superficial and guarded in his participation. Did indicate he was cautious with his current situation and in sharing with others.

## 2019-08-13 NOTE — PROGRESS NOTES
"Pt is calm and cooperative.  Affect is flat.  Lightly social with peers in the milieu.  Brief in his responses to writer - indicates he is having tactile hallucinations, passive SI, depression.  He declines to further elaborate stating, \"I will discuss with my doctor tomorrow.\"    "

## 2019-08-14 PROCEDURE — 12400002 ZZH R&B MH SENIOR/ADOLESCENT

## 2019-08-14 PROCEDURE — 25000132 ZZH RX MED GY IP 250 OP 250 PS 637: Performed by: PSYCHIATRY & NEUROLOGY

## 2019-08-14 PROCEDURE — G0177 OPPS/PHP; TRAIN & EDUC SERV: HCPCS

## 2019-08-14 PROCEDURE — 99232 SBSQ HOSP IP/OBS MODERATE 35: CPT | Performed by: PSYCHIATRY & NEUROLOGY

## 2019-08-14 RX ADMIN — NICOTINE 1 PATCH: 21 PATCH, EXTENDED RELEASE TRANSDERMAL at 08:13

## 2019-08-14 RX ADMIN — MIRTAZAPINE 30 MG: 30 TABLET, FILM COATED ORAL at 20:04

## 2019-08-14 RX ADMIN — QUETIAPINE FUMARATE 125 MG: 100 TABLET ORAL at 20:04

## 2019-08-14 RX ADMIN — QUETIAPINE FUMARATE 125 MG: 100 TABLET ORAL at 14:45

## 2019-08-14 RX ADMIN — CITALOPRAM HYDROBROMIDE 40 MG: 40 TABLET ORAL at 08:14

## 2019-08-14 RX ADMIN — MELATONIN 2000 UNITS: at 08:14

## 2019-08-14 RX ADMIN — QUETIAPINE FUMARATE 125 MG: 100 TABLET ORAL at 08:14

## 2019-08-14 ASSESSMENT — ACTIVITIES OF DAILY LIVING (ADL)
DRESS: INDEPENDENT
ORAL_HYGIENE: INDEPENDENT
DRESS: INDEPENDENT
HYGIENE/GROOMING: INDEPENDENT
HYGIENE/GROOMING: INDEPENDENT
LAUNDRY: WITH SUPERVISION
LAUNDRY: WITH SUPERVISION
ORAL_HYGIENE: INDEPENDENT

## 2019-08-14 NOTE — PROGRESS NOTES
Writer faxed patients records to the following IRTs programs; [facility], [facility], [facility], [facility], [facility], [facility], [facility] [place], [facility], [facility] and [facility].

## 2019-08-14 NOTE — PROGRESS NOTES
Behavioral Health  Note    Behavioral Health  Spirituality Group Note    UNIT 3BW    Name: Aldo Vernon YOB: 1960   MRN: [id number] Age: 58 year old      Patient attended -led group, which included discussion of spirituality, coping with illness and building resilience.    Patient attended group for 1.0 hrs.    The patient actively participated in group discussion and patient demonstrated an appreciation of topic's application for their personal circumstances.      Jenny Ivey MDiv, [facility]  Lead , Adult Behavioral Health  Pager [phone]

## 2019-08-14 NOTE — PROGRESS NOTES
08/13/19 2200   Therapeutic Recreation   Type of Intervention structured groups   Activity game   Response Participates, initiates socially appropriate   Hours 1     Pt actively participated in a structured Therapeutic Recreation group with a focus on leisure participation, stress reduction, and social engagement via a group game. Pt remained focused and engaged throughout full duration of group. Showed progress in session goals. Pt mood was calm and was appropriate with interactions.  Pt had a flat affect and talked in a soft, monotone voice, occasionally mumbled making it difficult to hear what was said.

## 2019-08-14 NOTE — PLAN OF CARE
Pt presented with calm mood, was social with other patients and attended group activities. Pt states he prefers to only talk to his MD regarding mental health symptoms. Pt is medication compliant.

## 2019-08-14 NOTE — PLAN OF CARE
Problem: OT General Care Plan  Goal: OT Goal 1  Description  Will develop insightful ideas for coping strategies and identify symptoms of when using them would be beneficial.    Note:   Attended 2 of 2 OT groups. He worked at a constant pace on a creative more abstract task. He was pleasant and social on approach. He participated in an activity requiring quick and again, creative answers. He offered multiple answers and appeared involved and invested in participating. Affect appears flat.

## 2019-08-14 NOTE — PROGRESS NOTES
Patient is visible in the milieu. He is tensed and irritable.  His affect is blunt and flat.. No aggressive behavior noted.  He is withdrawn. Patient sat quietly in the lounge while watching TV.  Patient speaks very briefly but refused to have 1:1 check-in.  So, writer not able to assess suicidal risk of the patient.  Patient is med compliant. He attended the group activity this evening.    He was visited by his wife early this evening bringing papers for him  to sign. Patient did not share what those papers were but they were a bunch which he signed.  His wife left immediately with the papers after the patient signed them.
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
## 2019-08-15 PROCEDURE — 12400002 ZZH R&B MH SENIOR/ADOLESCENT

## 2019-08-15 PROCEDURE — G0177 OPPS/PHP; TRAIN & EDUC SERV: HCPCS

## 2019-08-15 PROCEDURE — 99232 SBSQ HOSP IP/OBS MODERATE 35: CPT | Performed by: PSYCHIATRY & NEUROLOGY

## 2019-08-15 PROCEDURE — 25000132 ZZH RX MED GY IP 250 OP 250 PS 637: Performed by: PSYCHIATRY & NEUROLOGY

## 2019-08-15 PROCEDURE — 40000007 ZZH STATISTIC ADULT CD FACE TO FACE-NO CHRG

## 2019-08-15 PROCEDURE — H2032 ACTIVITY THERAPY, PER 15 MIN: HCPCS

## 2019-08-15 RX ADMIN — MELATONIN 2000 UNITS: at 08:52

## 2019-08-15 RX ADMIN — QUETIAPINE FUMARATE 125 MG: 100 TABLET ORAL at 14:15

## 2019-08-15 RX ADMIN — QUETIAPINE FUMARATE 125 MG: 100 TABLET ORAL at 08:52

## 2019-08-15 RX ADMIN — NICOTINE 1 PATCH: 21 PATCH, EXTENDED RELEASE TRANSDERMAL at 08:54

## 2019-08-15 RX ADMIN — QUETIAPINE FUMARATE 125 MG: 100 TABLET ORAL at 19:57

## 2019-08-15 RX ADMIN — MAGNESIUM HYDROXIDE 30 ML: 400 SUSPENSION ORAL at 21:02

## 2019-08-15 RX ADMIN — HYDROXYZINE HYDROCHLORIDE 25 MG: 25 TABLET ORAL at 14:15

## 2019-08-15 RX ADMIN — MIRTAZAPINE 30 MG: 30 TABLET, FILM COATED ORAL at 19:57

## 2019-08-15 RX ADMIN — CITALOPRAM HYDROBROMIDE 40 MG: 40 TABLET ORAL at 08:51

## 2019-08-15 ASSESSMENT — ACTIVITIES OF DAILY LIVING (ADL)
ORAL_HYGIENE: INDEPENDENT
ORAL_HYGIENE: INDEPENDENT
HYGIENE/GROOMING: INDEPENDENT
DRESS: INDEPENDENT;SCRUBS (BEHAVIORAL HEALTH)
HYGIENE/GROOMING: INDEPENDENT
DRESS: INDEPENDENT

## 2019-08-15 NOTE — PLAN OF CARE
"  Problem: OT General Care Plan  Goal: OT Goal 1  Description  Will develop insightful ideas for coping strategies and identify symptoms of when using them would be beneficial.    Note:   Attended 2 of 2 OT groups. He worked independently, quietly, planned and organized his work steps with success in attending to details. He is pleasant on approach and elaborates some with interactions. He participated in an activity requiring using visuospatial concepts with problem solving. As he participated and practiced with finding the solutions, he became quicker with identifying answers. He stated this to be challenging and successful in focusing on the activity completely instead of his problems \"and I have a lot of problems\".   "

## 2019-08-15 NOTE — PLAN OF CARE
"48 Hour Nursing Observation     Uncomplicated alcohol dependence (H) [F10.20]  Current severe episode of major depressive disorder with psychotic features, unspecified whether recurrent (H) [F32.3]    Admit Date: 8/9/2019    Length of Stay: 5    Patient evaluation continues. Assessed mood,anxiety,thoughts and behavior. Patient is progressing towards goals. Patient is encouraged to participate in groups and assisted to develop healthy coping skills.  Patient does not appear to be responding to auditory or visual hallucinations. /71   Pulse 73   Temp 98.1  F (36.7  C) (Tympanic)   Resp 16   Ht 1.727 m (5' 8\")   Wt 88.5 kg (195 lb)   SpO2 98%   BMI 29.65 kg/m      Mood: calm and cooperative      Depression and anxiety: JACOB, pt refuses to answer     Affect: bright and social    Sleep: adequate     Appetite: good    SI: JACOB     HI: JACOB    SIB: JACOB    Group participation: Attends and participates     ADL's: independent     Refer to daily team meeting notes for individualized plan of care. Nursing will continue to assess.    *Scale is 1-10 and 10 is the worst.         "
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
## 2019-08-15 NOTE — PROGRESS NOTES
"Pt has been up and involved. Pt is eating well at meals. Pt has been attending groups. Pt when asked how he was doing said \" Shitty.\" Pt explained that he is in process of a divorce. He has been  x 10 yrs. Pt reports he has thoughts of suicide but no plans. Later Pt approached this staff and whispered to this staff, \" There is Hope with that.\"   "
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
## 2019-08-15 NOTE — PROGRESS NOTES
Per MD progress note from today, pt is reporting visual hallucinations which are disturbing to him.  He will not be accepted to a CD or MICD treatment program while he is still experiencing hallucinations.  Writer supports a referral to an IRTS facility vs. CD treatment.  Pt can transition to CD treatment upon successful completion of an IRTS program.    Regarding CD evaluation and CD treatment: Per chart review, pt had a Rule 25 assessment 3 weeks ago by SERGE Pérez at [facility].  He was referred and accepted to [facility], [facility] and [facility].  Rule 25 assessments are valid for 45 days.  If pt is appropriate for CD treatment (not experiencing hallucinations), writer recommends unit contact intake coordinators at [facility], [facility], and [facility] to inquire about openings and wait lists.    Jermaine at [facility]  Phone: [phone]   Fax: [phone]    Aiden at [facility]  Phone: [phone]   Fax: [phone]    Beltran at [facility]  Phone: [phone]  Fax: [phone]    SERGE Spears  [phone]

## 2019-08-15 NOTE — CONSULTS
CD consult complete.  Pt participated in Rule 25 Update however, pt does not want to go to CD treatment at this time.  He is only open to an IRTS facility but is willing to revisit the idea of CD treatment once he is at an IRTS program.  Pt believes he needs an IRTS program for 90 days and then he may want to go to [facility] or another long-term program.  Full note with CD update to follow.    Malu Vergara, [facility]   [phone]

## 2019-08-15 NOTE — PROGRESS NOTES
Phone call with Lisette from [facility] ([phone]) reporting that she will meet the patient on Monday at 9:30 for an intake.    Phone call with [facility] to follow-up on CD referral. They reported that patient is on the wait list and the wait is about a week. They also reported that a new co-ed facility is opening and if patient is appropriate for that facility, he could admit early next week. They will assess and call writer.    Maty from [facility] ([phone]) called to report they have put patient on their wait list.    Phone call with Beltran of [facility] to follow-up on referral. Beltran reported a 10 day wait. He will need updated Rule 25 and a new fund request through [employer].    Writer met with patient to update on insurance, IRTS and CD treatment. Patient was happy to hear that his MA is active. He reported that he thinks the IRTS facility would be the best option at this time. Patient was given a list of all IRTS that received his referrals.    Phone call with Marcos from [facility] to answer questions. Marcos indicated he will place patient on wait list which is a long list of 55.    LVM with [facility]) to follow up on referral. Requested call back.    LVM with [facility] ([phone]) of [facility] requesting call back on referral.    Phone call with [facility] ([phone]) to follow up on referral. They have not processed referral yet.    LVM with [facility] ([phone]) to follow up on referral and requested call back.    Phone call with Cal [facility] ([phone]) to follow-up on referral. They will review his case tonight and call back tomorrow. They will have openings on August 20 and 21st.    Phone call with [facility] to follow up on referral. He will interview the patient on August 27 at 2:00 p.m.

## 2019-08-16 PROCEDURE — 12400002 ZZH R&B MH SENIOR/ADOLESCENT

## 2019-08-16 PROCEDURE — 25000132 ZZH RX MED GY IP 250 OP 250 PS 637: Performed by: PSYCHIATRY & NEUROLOGY

## 2019-08-16 PROCEDURE — G0177 OPPS/PHP; TRAIN & EDUC SERV: HCPCS

## 2019-08-16 PROCEDURE — H2032 ACTIVITY THERAPY, PER 15 MIN: HCPCS

## 2019-08-16 RX ADMIN — QUETIAPINE FUMARATE 125 MG: 100 TABLET ORAL at 14:24

## 2019-08-16 RX ADMIN — MIRTAZAPINE 30 MG: 30 TABLET, FILM COATED ORAL at 20:06

## 2019-08-16 RX ADMIN — CITALOPRAM HYDROBROMIDE 40 MG: 40 TABLET ORAL at 08:14

## 2019-08-16 RX ADMIN — MELATONIN 2000 UNITS: at 08:14

## 2019-08-16 RX ADMIN — HYDROXYZINE HYDROCHLORIDE 25 MG: 25 TABLET ORAL at 14:24

## 2019-08-16 RX ADMIN — QUETIAPINE FUMARATE 125 MG: 100 TABLET ORAL at 20:06

## 2019-08-16 RX ADMIN — QUETIAPINE FUMARATE 125 MG: 100 TABLET ORAL at 08:14

## 2019-08-16 RX ADMIN — NICOTINE 1 PATCH: 21 PATCH, EXTENDED RELEASE TRANSDERMAL at 08:13

## 2019-08-16 ASSESSMENT — ACTIVITIES OF DAILY LIVING (ADL)
DRESS: INDEPENDENT
HYGIENE/GROOMING: INDEPENDENT
DRESS: INDEPENDENT
HYGIENE/GROOMING: INDEPENDENT
ORAL_HYGIENE: INDEPENDENT
LAUNDRY: WITH SUPERVISION
ORAL_HYGIENE: INDEPENDENT

## 2019-08-16 NOTE — PROGRESS NOTES
"   08/15/19 8079   General Information   Art Directive other (see comments)   AT directive was to create a drawing/painting based off of feeling/emotions prompts written on drawing paper. Goals of directive: emotional expression, mindfulness, exploration of art media.  Pt was a positive participant, focused on task for the full duration of group.  Pt brandon an image of the chosen prompt of \"calm.\" Pt brandon a smiling face and a beach scene. Pt shared with group stories of vacations to various beaches.  Pts mood was calm, active participant.  "

## 2019-08-16 NOTE — PROGRESS NOTES
Pt continues to endorse a low depressed mood. Pt denies plans to injure self/suicide. Pt has been continuing to be involved in groups. He has been present at meals and is eating well. He is med compliant.

## 2019-08-16 NOTE — PROGRESS NOTES
Patient attended a 60 minute psychoeducation group on the Cognitive Model. The relationship between thoughts, emotions and behaviors was discussed. Participants discussed several scenarios and ways to change irrational thoughts into positive and rational thoughts. They also discussed the new emotion and behavior that came from positive, rational thoughts. Patient was an active participant.

## 2019-08-16 NOTE — PROGRESS NOTES
"Aldo has been up in the milieu, eating well at meals, attending groups, medication compliant. When asked how he was doing, he shrugged and said \"alright.\"  Pt explained that he is in process of a divorce and was served papers by his wife yesterday here in the dining room in front of everyone. He was understandably quite shocked & upset by this.  Pt reports passive thoughts of suicide but no plans. [facility] and seemed to brighten a bit when talking to RN.      He reports some constipation and was given Milk of Magnesia.  "

## 2019-08-17 PROCEDURE — 25000132 ZZH RX MED GY IP 250 OP 250 PS 637: Performed by: PSYCHIATRY & NEUROLOGY

## 2019-08-17 PROCEDURE — 12400002 ZZH R&B MH SENIOR/ADOLESCENT

## 2019-08-17 PROCEDURE — H2032 ACTIVITY THERAPY, PER 15 MIN: HCPCS

## 2019-08-17 RX ADMIN — MIRTAZAPINE 30 MG: 30 TABLET, FILM COATED ORAL at 20:04

## 2019-08-17 RX ADMIN — QUETIAPINE FUMARATE 125 MG: 100 TABLET ORAL at 20:04

## 2019-08-17 RX ADMIN — NICOTINE 1 PATCH: 21 PATCH, EXTENDED RELEASE TRANSDERMAL at 08:37

## 2019-08-17 RX ADMIN — MELATONIN 2000 UNITS: at 08:37

## 2019-08-17 RX ADMIN — QUETIAPINE FUMARATE 125 MG: 100 TABLET ORAL at 08:37

## 2019-08-17 RX ADMIN — QUETIAPINE FUMARATE 125 MG: 100 TABLET ORAL at 13:46

## 2019-08-17 RX ADMIN — CITALOPRAM HYDROBROMIDE 40 MG: 40 TABLET ORAL at 08:37

## 2019-08-17 ASSESSMENT — ACTIVITIES OF DAILY LIVING (ADL)
ORAL_HYGIENE: INDEPENDENT
DRESS: INDEPENDENT
HYGIENE/GROOMING: INDEPENDENT
LAUNDRY: WITH SUPERVISION
HYGIENE/GROOMING: INDEPENDENT
ORAL_HYGIENE: INDEPENDENT
DRESS: INDEPENDENT

## 2019-08-17 NOTE — PROGRESS NOTES
Pt up and involved this AM. Eating well, med compliant,social. Pt mood continues low. In process of divorce. Pt has thoughts of suicide. But states he has no plans to injure self.

## 2019-08-17 NOTE — PROGRESS NOTES
Pt has been visible in the milieu. Pt ate dinner and snacks well. Pt attended groups. Pt mood is depressed and affect is calm. Pt is quiet and medication compliant. Pt denies suicidal ideation.

## 2019-08-18 PROCEDURE — H2032 ACTIVITY THERAPY, PER 15 MIN: HCPCS

## 2019-08-18 PROCEDURE — 25000132 ZZH RX MED GY IP 250 OP 250 PS 637: Performed by: PSYCHIATRY & NEUROLOGY

## 2019-08-18 PROCEDURE — 12400002 ZZH R&B MH SENIOR/ADOLESCENT

## 2019-08-18 RX ADMIN — QUETIAPINE FUMARATE 125 MG: 100 TABLET ORAL at 09:10

## 2019-08-18 RX ADMIN — HYDROXYZINE HYDROCHLORIDE 25 MG: 25 TABLET ORAL at 13:42

## 2019-08-18 RX ADMIN — CITALOPRAM HYDROBROMIDE 40 MG: 40 TABLET ORAL at 09:10

## 2019-08-18 RX ADMIN — MELATONIN 2000 UNITS: at 09:10

## 2019-08-18 RX ADMIN — QUETIAPINE FUMARATE 125 MG: 100 TABLET ORAL at 13:42

## 2019-08-18 RX ADMIN — NICOTINE 1 PATCH: 21 PATCH, EXTENDED RELEASE TRANSDERMAL at 09:09

## 2019-08-18 RX ADMIN — MIRTAZAPINE 30 MG: 30 TABLET, FILM COATED ORAL at 21:03

## 2019-08-18 RX ADMIN — QUETIAPINE FUMARATE 125 MG: 100 TABLET ORAL at 21:03

## 2019-08-18 ASSESSMENT — ACTIVITIES OF DAILY LIVING (ADL)
HYGIENE/GROOMING: INDEPENDENT
HYGIENE/GROOMING: INDEPENDENT
ORAL_HYGIENE: INDEPENDENT
DRESS: INDEPENDENT
ORAL_HYGIENE: INDEPENDENT
DRESS: INDEPENDENT

## 2019-08-18 ASSESSMENT — MIFFLIN-ST. JEOR: SCORE: 1707.15

## 2019-08-18 NOTE — PROGRESS NOTES
Pt has been pleasant. Pt has been present for meals and is eating well. Pt involves self in unit protocols. Pt is med compliant. Pt cont with sadness/depression re divorce. Pt does report he has thoughts about suicide but has no plan.Pt is noted napping during free time. Pleasant.

## 2019-08-18 NOTE — PLAN OF CARE
" Pt has sad affect - somewhat bright during interactions.  Pleasant and cooperative, attending groups, social with peers.  Endorses fleeting non specific SI - states, \"I feel like things are coming together for me now, so I have hope.\"  Appreciative of care and being on the unit - \"it's good to be here.\"  Endorses depression - \"getting better, but it's hard to be here for so long.\"  Appetite and sleep are good.  Compliant with medications.    "

## 2019-08-19 PROCEDURE — G0177 OPPS/PHP; TRAIN & EDUC SERV: HCPCS

## 2019-08-19 PROCEDURE — 12400002 ZZH R&B MH SENIOR/ADOLESCENT

## 2019-08-19 PROCEDURE — 93005 ELECTROCARDIOGRAM TRACING: CPT

## 2019-08-19 PROCEDURE — 93010 ELECTROCARDIOGRAM REPORT: CPT | Performed by: INTERNAL MEDICINE

## 2019-08-19 PROCEDURE — 25000132 ZZH RX MED GY IP 250 OP 250 PS 637: Performed by: PSYCHIATRY & NEUROLOGY

## 2019-08-19 RX ORDER — CITALOPRAM HYDROBROMIDE 40 MG/1
40 TABLET ORAL EVERY MORNING
Qty: 30 TABLET | Refills: 3 | Status: SHIPPED | OUTPATIENT
Start: 2019-08-19 | End: 2021-05-27

## 2019-08-19 RX ORDER — QUETIAPINE FUMARATE 25 MG/1
125 TABLET, FILM COATED ORAL 3 TIMES DAILY
Qty: 450 TABLET | Refills: 3 | Status: SHIPPED | OUTPATIENT
Start: 2019-08-19 | End: 2021-05-27

## 2019-08-19 RX ORDER — HYDROXYZINE HYDROCHLORIDE 25 MG/1
25 TABLET, FILM COATED ORAL EVERY 4 HOURS PRN
Qty: 60 TABLET | Refills: 3 | Status: SHIPPED | OUTPATIENT
Start: 2019-08-19 | End: 2021-05-27

## 2019-08-19 RX ORDER — NICOTINE 21 MG/24HR
1 PATCH, TRANSDERMAL 24 HOURS TRANSDERMAL DAILY
Qty: 28 PATCH | Refills: 3 | Status: SHIPPED | OUTPATIENT
Start: 2019-08-20 | End: 2021-05-27

## 2019-08-19 RX ADMIN — MIRTAZAPINE 30 MG: 30 TABLET, FILM COATED ORAL at 20:36

## 2019-08-19 RX ADMIN — MELATONIN 2000 UNITS: at 08:22

## 2019-08-19 RX ADMIN — NICOTINE 1 PATCH: 21 PATCH, EXTENDED RELEASE TRANSDERMAL at 08:22

## 2019-08-19 RX ADMIN — QUETIAPINE FUMARATE 125 MG: 100 TABLET ORAL at 20:36

## 2019-08-19 RX ADMIN — QUETIAPINE FUMARATE 125 MG: 100 TABLET ORAL at 08:22

## 2019-08-19 RX ADMIN — QUETIAPINE FUMARATE 125 MG: 100 TABLET ORAL at 13:33

## 2019-08-19 RX ADMIN — CITALOPRAM HYDROBROMIDE 40 MG: 40 TABLET ORAL at 08:22

## 2019-08-19 ASSESSMENT — ACTIVITIES OF DAILY LIVING (ADL)
HYGIENE/GROOMING: INDEPENDENT
DRESS: INDEPENDENT
ORAL_HYGIENE: INDEPENDENT
HYGIENE/GROOMING: INDEPENDENT
LAUNDRY: WITH SUPERVISION
ORAL_HYGIENE: INDEPENDENT
LAUNDRY: WITH SUPERVISION
DRESS: INDEPENDENT

## 2019-08-19 NOTE — PROGRESS NOTES
"Writer attempted to follow-up with pt for check-in - pt states, \"you know how I am - it's the same.\"  Writer asked if it was the same as yesterday and pt states, \"yes.\"  Pt does endorse somewhat higher anxiety regarding IRTS interview tomorrow.  Affect is flat / sad.  Lightly social with peers at times.  Attending groups and present in the milieu.    Yesterday pt indicated passive fleeting SI.    "

## 2019-08-19 NOTE — PROGRESS NOTES
"Patient seen, chart reviewed, care discussed with staff.    Blood pressure 110/66, pulse 73, temperature 97  F (36.1  C), temperature source Tympanic, resp. rate 16, height 1.727 m (5' 8\"), weight 90.8 kg (200 lb 1.6 oz), SpO2 98 %.    Alert.  Affect fair.  Speech normal.  Eye contact good.  Psychomotor behavior and gait  normal.  No delusions or hallucinations.  Thoughts logical.  Associations intact. Cognitions intact.  Not suicidal.    Discharge discussed, hopefully tomorrow.    seroquel side effects of diabetes, akathisia, TD discussed.    Plan: same meds  2.  EKG ordered for QTC in view of current meds  ADDENDUM:  [id number]=[id number]      MED REC COMPLETED      Current Facility-Administered Medications:      acetaminophen (TYLENOL) tablet 650 mg, 650 mg, Oral, Q4H PRN, Aldo Kerns MD, 650 mg at 08/13/19 0833     citalopram (celeXA) tablet 40 mg, 40 mg, Oral, Daily, Charlie Gastelum MD, 40 mg at 08/19/19 0822     hydrOXYzine (ATARAX) tablet 25 mg, 25 mg, Oral, Q4H PRN, Charlie Gastelum MD, 25 mg at 08/18/19 1342     ibuprofen (ADVIL/MOTRIN) tablet 600 mg, 600 mg, Oral, Q6H PRN, Aldo Kerns MD, 600 mg at 08/11/19 0855     magnesium hydroxide (MILK OF MAGNESIA) suspension 30 mL, 30 mL, Oral, Daily PRN, Aldo Kerns MD, 30 mL at 08/15/19 2102     mirtazapine (REMERON) tablet 30 mg, 30 mg, Oral, At Bedtime, Aldo Kerns MD, 30 mg at 08/18/19 2103     nicotine (NICODERM CQ) 21 MG/24HR 24 hr patch 1 patch, 1 patch, Transdermal, Daily, Charlie Gastelum MD, 1 patch at 08/19/19 0822     nicotine Patch in Place, , Transdermal, Q8H, Charlie Gastelum MD     nicotine patch REMOVAL, , Transdermal, Daily, Charlie Gastelum MD     QUEtiapine (SEROquel) tablet 125 mg, 125 mg, Oral, TID, Aldo Kerns MD, 125 mg at 08/19/19 1333     ranitidine (ZANTAC) tablet 150 mg, 150 mg, Oral, BID PRN, Charlie Gastelum MD     vitamin D3 (CHOLECALCIFEROL) 1000 units (25 mcg) tablet 2,000 " Units, 2,000 Units, Oral, Daily, Charlie Gastelum MD, 2,000 Units at 08/19/19 0822  Recent Results (from the past 672 hour(s))   Drug abuse screen 6 urine (tox)    Collection Time: 08/09/19  1:50 PM   Result Value Ref Range    Amphetamine Qual Urine Negative NEG^Negative    Barbiturates Qual Urine Negative NEG^Negative    Benzodiazepine Qual Urine Negative NEG^Negative    Cannabinoids Qual Urine Negative NEG^Negative    Cocaine Qual Urine Negative NEG^Negative    Ethanol Qual Urine Negative NEG^Negative    Opiates Qualitative Urine Negative NEG^Negative   CBC with platelets    Collection Time: 08/10/19  8:47 AM   Result Value Ref Range    WBC 5.8 4.0 - 11.0 10e9/L    RBC Count 4.83 4.4 - 5.9 10e12/L    Hemoglobin 15.3 13.3 - 17.7 g/dL    Hematocrit 44.8 40.0 - 53.0 %    MCV 93 78 - 100 fl    MCH 31.7 26.5 - 33.0 pg    MCHC 34.2 31.5 - 36.5 g/dL    RDW 12.2 10.0 - 15.0 %    Platelet Count 287 150 - 450 10e9/L   Comprehensive metabolic panel    Collection Time: 08/10/19  8:47 AM   Result Value Ref Range    Sodium 139 133 - 144 mmol/L    Potassium 4.1 3.4 - 5.3 mmol/L    Chloride 109 94 - 109 mmol/L    Carbon Dioxide 22 20 - 32 mmol/L    Anion Gap 8 3 - 14 mmol/L    Glucose 103 (H) 70 - 99 mg/dL    Urea Nitrogen 14 7 - 30 mg/dL    Creatinine 0.88 0.66 - 1.25 mg/dL    GFR Estimate >90 >60 mL/min/[1.73_m2]    GFR Estimate If Black >90 >60 mL/min/[1.73_m2]    Calcium 9.0 8.5 - 10.1 mg/dL    Bilirubin Total 0.7 0.2 - 1.3 mg/dL    Albumin 3.9 3.4 - 5.0 g/dL    Protein Total 7.5 6.8 - 8.8 g/dL    Alkaline Phosphatase 87 40 - 150 U/L    ALT 72 (H) 0 - 70 U/L    AST 28 0 - 45 U/L

## 2019-08-19 NOTE — PLAN OF CARE
Problem: OT General Care Plan  Goal: OT Goal 1  Description  Will develop insightful ideas for coping strategies and identify symptoms of when using them would be beneficial.    Note:   Attended 2 of 2 OT groups. He worked successfully on a task requiring planning and organizing and took time to master the details. He joked some and smiled in context of conversation. He supported others. He also worked more successfully on a task that he had done early last week requiring using visuospatial concepts in problem solving and seemed to master the solutions more quickly.  He participated in an activity focused on identifying helpful ideas for calming using the 5 senses, and practicing a grounding technique with this focus. Ideas were insightful and resourceful. He stated looking forward to discharge and stated feeling ready.

## 2019-08-19 NOTE — PROGRESS NOTES
P/C from [facility] - [phone] [facility].  Pt is admitted to their [place] location.  She will FAX paperwork to be completed and sent back before his admission.  They scheduled admit for Wednesday 8/21/19 10 am.  Their pharmacy is [facility] in [place].  They request Psychiatry appoint to be scheduled.

## 2019-08-19 NOTE — PROGRESS NOTES
08/18/19 2000   Therapeutic Recreation   Type of Intervention structured groups   Activity game   Response Participates, initiates socially appropriate   Hours 1     Pt participated in Therapeutic Recreation group with focus on stress reduction, socialization, cognitive processes, and leisure participation. Engaged and cooperative in group recreational intervention; word puzzles. Pt participated throughout entire duration of the group. Showed progress in session goals. Pt added to the group discussion during the activity, often using some humor appropriately while interacting with others. Pt seemed to brighten with socialization and participation in the activity.

## 2019-08-19 NOTE — PROGRESS NOTES
"Pt refused to check-in with Writer. He stated \"you know all of the answers already\" and when Writer stated that this was not the case since the Pt and Writer have never checked-in before, Pt responded with \"yes you do know them\". Pts mood was slightly irritable but still calm. Pt has been present in the milieu during the whole shift and attended the OT group.     No other known concerns   "

## 2019-08-19 NOTE — PLAN OF CARE
Patient was out in the milieu and attending group activities. He reports improved mood, decreased anxiety and depression. Pt reports fleeting thoughts of SI without a plan but states he feels hopeful and looking forward to discharging tomorrow.

## 2019-08-20 LAB — INTERPRETATION ECG - MUSE: NORMAL

## 2019-08-20 PROCEDURE — 25000132 ZZH RX MED GY IP 250 OP 250 PS 637: Performed by: PSYCHIATRY & NEUROLOGY

## 2019-08-20 PROCEDURE — 12400002 ZZH R&B MH SENIOR/ADOLESCENT

## 2019-08-20 PROCEDURE — G0177 OPPS/PHP; TRAIN & EDUC SERV: HCPCS

## 2019-08-20 RX ADMIN — MAGNESIUM HYDROXIDE 30 ML: 400 SUSPENSION ORAL at 18:17

## 2019-08-20 RX ADMIN — QUETIAPINE FUMARATE 125 MG: 100 TABLET ORAL at 08:41

## 2019-08-20 RX ADMIN — CITALOPRAM HYDROBROMIDE 40 MG: 40 TABLET ORAL at 08:41

## 2019-08-20 RX ADMIN — QUETIAPINE FUMARATE 125 MG: 100 TABLET ORAL at 21:16

## 2019-08-20 RX ADMIN — QUETIAPINE FUMARATE 125 MG: 100 TABLET ORAL at 14:53

## 2019-08-20 RX ADMIN — MELATONIN 2000 UNITS: at 08:41

## 2019-08-20 RX ADMIN — MIRTAZAPINE 30 MG: 30 TABLET, FILM COATED ORAL at 21:15

## 2019-08-20 RX ADMIN — NICOTINE 1 PATCH: 21 PATCH, EXTENDED RELEASE TRANSDERMAL at 08:41

## 2019-08-20 ASSESSMENT — ACTIVITIES OF DAILY LIVING (ADL)
HYGIENE/GROOMING: INDEPENDENT
ORAL_HYGIENE: INDEPENDENT
DRESS: INDEPENDENT
LAUNDRY: WITH SUPERVISION
ORAL_HYGIENE: INDEPENDENT
DRESS: INDEPENDENT
HYGIENE/GROOMING: INDEPENDENT

## 2019-08-20 ASSESSMENT — MIFFLIN-ST. JEOR: SCORE: 1708.51

## 2019-08-20 NOTE — PROGRESS NOTES
Pt appeared to sleep all night and was checked on q15 min. Nursing will continue to monitor and assess.

## 2019-08-20 NOTE — PROGRESS NOTES
"Rates depression and anxiety at 5/10, \"much better than when I came in\".  Still has passive suicidal thoughts.  Happy about discharge tomorrow   "

## 2019-08-20 NOTE — PROVIDER NOTIFICATION
"Patient was present in the milieu this shift.   He was very short and dismissive with writer during the check-in stating \"I'm getting out of here tomorrow\"   He did deny SI/SIB/Hallucinations.   Reported his appetite was fine and medications were working well.   When writer asked what his discharge plan was he denied to share with writer stating \"I've been asked too many questions today\"        08/19/19 1943   Behavioral Health   Thoughts/Cognition (WDL) WDL   Hallucinations denies / not responding to hallucinations   Thinking   (fair)   Orientation person: oriented;place: oriented;date: oriented;time: oriented   Memory baseline memory   Insight admits / accepts   Judgement intact   Eye Contact at examiner   Affect/Mood (WDL) WDL   ADL Assessment (WDL) WDL   Suicidality (WDL) WDL   Suicidality other (see comments)  (denies)   1. Wish to be Dead No   2. Non-Specific Active Suicidal Thoughts  No   Self Injury other (see comment)  (denies)   Elopement (WDL) WDL   Activity (WDL) WDL   Speech (WDL) WDL   Medication Sensitivity (WDL) WDL   Psychomotor Gait (WDL) WDL   Overt Agression (WDL) WDL   Coping/Psychosocial   Verbalized Emotional State acceptance   Plan of Care Reviewed With patient   Safety   Suicidality Status 15   Activities of Daily Living   Hygiene/Grooming independent   Oral Hygiene independent   Dress independent   Laundry with supervision   Room Organization independent     "

## 2019-08-20 NOTE — PLAN OF CARE
Patient appeared calm, social and attending unit activities. He reports feeling hopeful, some fleeting thoughts of SI but feels safe. Patient reports anxiety and depression are both low and he is looking forward to discharging tomorrow.

## 2019-08-20 NOTE — PLAN OF CARE
Problem: OT General Care Plan  Goal: OT Goal 1  Description  Will develop insightful ideas for coping strategies and identify symptoms of when using them would be beneficial.    Note:   Attended 1 of 2 OT groups. He worked in completing a creative and detailed task. He sat and socialized the rest of the session, stating looking forward to discharge tomorrow.

## 2019-08-20 NOTE — DISCHARGE INSTRUCTIONS
Behavioral Discharge Planning and Instructions      Summary:  You were admitted on 8/9/2019  due to Chemical Use Issues.  You were treated by Dr. Stanford Verdin MD and discharged on 08/21/2019 from [facility]-   [facility]  [phone]-  [address]      Principal Diagnosis:   Current severe episode of major depressive disorder with psychotic features, unspecified whether recurrent (H)  Uncomplicated alcohol dependence (H)    Health Care Follow-up Appointments:   Date/Time: Wednesday September 4, 9 am    Provider: GENEVA Diop  Address:[address] [place] [id number]     [phone]    Attend all scheduled appointments with your outpatient providers. Call at least 24 hours in advance if you need to reschedule an appointment to ensure continued access to your outpatient providers.   Major Treatments, Procedures and Findings:  You were provided with: a psychiatric assessment, assessed for medical stability, medication evaluation and/or management, group therapy, CD evaluation/assessment and milieu management    Symptoms to Report: feeling more aggressive, increased confusion, losing more sleep, mood getting worse or thoughts of suicide    Early warning signs can include: increased depression or anxiety sleep disturbances increased thoughts or behaviors of suicide or self-harm  increased unusual thinking, such as paranoia or hearing voices    Safety and Wellness:  Take all medicines as directed.  Make no changes unless your doctor suggests them.      Follow treatment recommendations.  Refrain from alcohol and non-prescribed drugs.  If there is a concern for safety, call 911.    Resources:   Crisis Intervention: [phone] or [phone] (TTY: 137.265.3173).  Call anytime for help.  Alcoholics Anonymous (www.alcoholics-anonymous.org): Check your phone book for your local chapter.  [facility] Crisis Response  [phone]      The treatment team has appreciated the opportunity to work with you.     If you have any questions or concerns our unit number is [phone]  You may be receiving a follow-up phone call within the next three days from a representative from behavioral health.

## 2019-08-21 VITALS
RESPIRATION RATE: 16 BRPM | WEIGHT: 200.4 LBS | SYSTOLIC BLOOD PRESSURE: 126 MMHG | HEART RATE: 71 BPM | HEIGHT: 68 IN | BODY MASS INDEX: 30.37 KG/M2 | DIASTOLIC BLOOD PRESSURE: 83 MMHG | OXYGEN SATURATION: 97 % | TEMPERATURE: 97.1 F

## 2019-08-21 PROCEDURE — 25000132 ZZH RX MED GY IP 250 OP 250 PS 637: Performed by: PSYCHIATRY & NEUROLOGY

## 2019-08-21 RX ADMIN — QUETIAPINE FUMARATE 125 MG: 100 TABLET ORAL at 08:19

## 2019-08-21 RX ADMIN — NICOTINE 1 PATCH: 21 PATCH, EXTENDED RELEASE TRANSDERMAL at 08:20

## 2019-08-21 RX ADMIN — MELATONIN 2000 UNITS: at 08:19

## 2019-08-21 RX ADMIN — CITALOPRAM HYDROBROMIDE 40 MG: 40 TABLET ORAL at 08:19

## 2019-08-21 ASSESSMENT — ACTIVITIES OF DAILY LIVING (ADL)
LAUNDRY: WITH SUPERVISION
DRESS: INDEPENDENT
HYGIENE/GROOMING: INDEPENDENT
ORAL_HYGIENE: INDEPENDENT
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
## 2019-08-21 NOTE — PROGRESS NOTES
08/20/19 2200   General Information   Art Directive other (see comments)   AT directive was to create a mindfulness mandala using art media of pt's choice. Goals of directive: mindfulness, emotional expression.Pt was a positive participant, focused on task for the full duration of group. Pt created a ezequiel-yang symbol to represent striving for balance in his life.  Pts mood was calm.

## 2019-08-21 NOTE — PLAN OF CARE
Pt denies active suicidal ideation or homicidal ideation. Pt does endorse fleeting thoughts of SI but states he feels safe to discharge and has no plan of harming self. Has received all belongings. Discharge medications and follow up instructions reviewed with patient. Patient verbalizes understanding of discharge instructions.

## 2019-08-28 ENCOUNTER — OFFICE VISIT (OUTPATIENT)
Dept: FAMILY MEDICINE | Facility: CLINIC | Age: 59
End: 2019-08-28
Payer: MEDICAID

## 2019-08-28 VITALS
RESPIRATION RATE: 16 BRPM | SYSTOLIC BLOOD PRESSURE: 114 MMHG | WEIGHT: 207 LBS | HEART RATE: 77 BPM | TEMPERATURE: 98.3 F | BODY MASS INDEX: 31.37 KG/M2 | HEIGHT: 68 IN | DIASTOLIC BLOOD PRESSURE: 62 MMHG | OXYGEN SATURATION: 94 %

## 2019-08-28 DIAGNOSIS — F33.2 SEVERE EPISODE OF RECURRENT MAJOR DEPRESSIVE DISORDER, WITHOUT PSYCHOTIC FEATURES (H): ICD-10-CM

## 2019-08-28 DIAGNOSIS — E56.9 VITAMIN DEFICIENCY: ICD-10-CM

## 2019-08-28 DIAGNOSIS — K21.9 GASTROESOPHAGEAL REFLUX DISEASE, ESOPHAGITIS PRESENCE NOT SPECIFIED: ICD-10-CM

## 2019-08-28 DIAGNOSIS — F10.20 ALCOHOL USE DISORDER, MODERATE, DEPENDENCE (H): Primary | ICD-10-CM

## 2019-08-28 PROCEDURE — 99203 OFFICE O/P NEW LOW 30 MIN: CPT | Performed by: FAMILY MEDICINE

## 2019-08-28 ASSESSMENT — ANXIETY QUESTIONNAIRES
2. NOT BEING ABLE TO STOP OR CONTROL WORRYING: NEARLY EVERY DAY
7. FEELING AFRAID AS IF SOMETHING AWFUL MIGHT HAPPEN: NEARLY EVERY DAY
3. WORRYING TOO MUCH ABOUT DIFFERENT THINGS: NEARLY EVERY DAY
5. BEING SO RESTLESS THAT IT IS HARD TO SIT STILL: NOT AT ALL
1. FEELING NERVOUS, ANXIOUS, OR ON EDGE: NEARLY EVERY DAY
GAD7 TOTAL SCORE: 15
IF YOU CHECKED OFF ANY PROBLEMS ON THIS QUESTIONNAIRE, HOW DIFFICULT HAVE THESE PROBLEMS MADE IT FOR YOU TO DO YOUR WORK, TAKE CARE OF THINGS AT HOME, OR GET ALONG WITH OTHER PEOPLE: VERY DIFFICULT
6. BECOMING EASILY ANNOYED OR IRRITABLE: SEVERAL DAYS

## 2019-08-28 ASSESSMENT — MIFFLIN-ST. JEOR: SCORE: 1738.45

## 2019-08-28 ASSESSMENT — PATIENT HEALTH QUESTIONNAIRE - PHQ9
SUM OF ALL RESPONSES TO PHQ QUESTIONS 1-9: 18
5. POOR APPETITE OR OVEREATING: MORE THAN HALF THE DAYS
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
## 2019-08-29 ASSESSMENT — ANXIETY QUESTIONNAIRES: GAD7 TOTAL SCORE: 15

## 2019-10-18 PROBLEM — E44.0 MODERATE MALNUTRITION (H): Status: ACTIVE | Noted: 2019-07-21

## 2019-10-18 PROBLEM — K76.0 FATTY INFILTRATION OF LIVER: Status: ACTIVE | Noted: 2018-05-21

## 2019-10-18 PROBLEM — E44.0 MODERATE MALNUTRITION (H): Status: RESOLVED | Noted: 2019-07-21 | Resolved: 2019-10-18

## 2019-10-18 PROBLEM — F32.A DEPRESSION: Status: RESOLVED | Noted: 2019-08-09 | Resolved: 2019-10-18

## 2019-10-18 PROBLEM — F60.89 MIXED PERSONALITY DISORDER (H): Status: ACTIVE | Noted: 2019-07-21

## 2019-10-18 PROBLEM — F43.10 POSTTRAUMATIC STRESS DISORDER: Status: ACTIVE | Noted: 2019-07-21

## 2019-10-18 PROBLEM — M62.08 DIASTASIS RECTI: Status: ACTIVE | Noted: 2019-10-18

## 2019-10-18 PROBLEM — F17.200 TOBACCO USE DISORDER: Status: ACTIVE | Noted: 2019-07-21

## 2019-10-18 PROBLEM — F12.20 CANNABIS USE DISORDER, MODERATE, DEPENDENCE (H): Status: ACTIVE | Noted: 2019-07-21

## 2019-10-18 PROBLEM — K42.9 UMBILICAL HERNIA WITHOUT OBSTRUCTION AND WITHOUT GANGRENE: Status: ACTIVE | Noted: 2017-01-31

## 2021-01-21 ENCOUNTER — TRANSFERRED RECORDS (OUTPATIENT)
Dept: HEALTH INFORMATION MANAGEMENT | Facility: CLINIC | Age: 61
End: 2021-01-21

## 2021-01-29 NOTE — PROGRESS NOTES
Assessment & Plan     Other male erectile dysfunction  States that he is having problems with satisfying his current partner.  We discussed the fact that he is not having any exercise related issues.  He has been able to obtain erection just not maintaining erection.  He is able to find at least 2 flights of stairs without stopping to rest in between and has not had any overt shortness of breath.  Consideration of Viagra or Cialis to be done based on results from labs are pending at this point time.    Severe episode of recurrent major depressive disorder, without psychotic features (H)  Tobacco use disorder  Alcohol dependence with unspecified alcohol-induced disorder (H)  Further evaluation and treatment to be deferred to his recovery center.  - HIV Antigen Antibody Combo  - PSA, screen  - **Testosterone Free and Total FUTURE anytime  - LDL cholesterol direct    Spinal stenosis in cervical region  Trochanteric bursitis of left hip  Acute midline low back pain with left-sided sciatica  Gastroesophageal reflux disease with esophagitis without hemorrhage  Chronic pain of both knees  Problematic based on his history as we are starting to get to know him better and we may not have all the information present.  Advised at this point time that he should continue with his current alcohol treatment facility and follow-up as needed.    Screening for hyperlipidemia  Screening for HIV (human immunodeficiency virus)  Need for hepatitis C screening test  - Hepatitis C Screen Reflex to HCV RNA Quant and Genotype    Screen for colon cancer  States he had a colonoscopy back in 2017 in [facility].  Records requested.      Review of external notes as documented above   Review of the result(s) of each unique test - Complete blood cell count comprehensive metabolic profile thyroid-stimulating hormone       Tobacco Cessation:   reports that he has been smoking cigarettes. He has never used smokeless tobacco.  Tobacco Cessation  "Action Plan: Information offered: Patient not interested at this time    BMI:   Estimated body mass index is 28.59 kg/m  as calculated from the following:    Height as of this encounter: 1.727 m (5' 8\").    Weight as of this encounter: 85.3 kg (188 lb).   Weight management plan: Discussed healthy diet and exercise guidelines    Depression Screening Follow Up    PHQ 2/1/2021   PHQ-9 Total Score 9   Q9: Thoughts of better off dead/self-harm past 2 weeks Several days   F/U: Thoughts of suicide or self-harm No   F/U: Safety concerns No     Last PHQ-9 2/1/2021   1.  Little interest or pleasure in doing things 1   2.  Feeling down, depressed, or hopeless 2   3.  Trouble falling or staying asleep, or sleeping too much 0   4.  Feeling tired or having little energy 2   5.  Poor appetite or overeating 0   6.  Feeling bad about yourself 2   7.  Trouble concentrating 1   8.  Moving slowly or restless 0   Q9: Thoughts of better off dead/self-harm past 2 weeks 1   PHQ-9 Total Score 9   Difficulty at work, home, or with people -   In the past two weeks have you had thoughts of suicide or self harm? No   Do you have concerns about your personal safety or the safety of others? No         Follow Up  Follow Up Actions Taken  Patient declined referral.    Discussed the following ways the patient can remain in a safe environment:  remove alcohol and remove drugs    Work on weight loss  Regular exercise  Return in about 4 weeks (around 3/1/2021) for recheck of current condition, if symptoms do not improve.    HOSSEIN [facility] TIMOTEO Carlson is a 60 year old who presents to clinic today for the following health issues     History of Present Illness       He eats 0-1 servings of fruits and vegetables daily.He consumes 3 sweetened beverage(s) daily.He exercises with enough effort to increase his heart rate 9 or less minutes per day.  He exercises with enough effort to increase his heart rate 3 " "or less days per week.   He is taking medications regularly.           Hospital Follow-up Visit:    Hospital/Nursing Home/IP Rehab Facility: [facility]  Date of Admission: 13 January 2021  Date of Discharge: 21 January 2021  Reason(s) for Admission: Suicidal ideation and alcohol abuse      Was your hospitalization related to COVID-19? No   Problems taking medications regularly:  None  Medication changes since discharge: None  Problems adhering to non-medication therapy:  None    Summary of hospitalization:  CareEverywhere information obtained and reviewed  Diagnostic Tests/Treatments reviewed.  Follow up needed: Continue to follow-up with substance recovery program  Other Healthcare Providers Involved in Patient s Care:         None  Update since discharge: improved. Post Discharge Medication Reconciliation: discharge medications reconciled, continue medications without change.  Plan of care communicated with patient            Review of Systems   Constitutional, HEENT, cardiovascular, pulmonary, GI, , musculoskeletal, neuro, skin, endocrine and psych systems are negative, except as otherwise noted.      Objective    /64   Pulse 77   Temp 97.7  F (36.5  C) (Temporal)   Ht 1.727 m (5' 8\")   Wt 85.3 kg (188 lb)   SpO2 96%   BMI 28.59 kg/m    Body mass index is 28.59 kg/m .  Physical Exam   GENERAL: healthy, alert and no distress  NECK: no adenopathy, no asymmetry, masses, or scars and thyroid normal to palpation  RESP: lungs clear to auscultation - no rales, rhonchi or wheezes  CV: regular rate and rhythm, normal S1 S2, no S3 or S4, no murmur, click or rub, no peripheral edema and peripheral pulses strong  ABDOMEN: soft, nontender, no hepatosplenomegaly, no masses and bowel sounds normal  MS: no gross musculoskeletal defects noted, no edema    No results found for any visits on 02/01/21.          Answers for HPI/ROS submitted by the patient on 2/1/2021   Chronic problems general questions HPI " Form  If you checked off any problems, how difficult have these problems made it for you to do your work, take care of things at home, or get along with other people?: Somewhat difficult  PHQ9 TOTAL SCORE: 9

## 2021-02-01 ENCOUNTER — OFFICE VISIT (OUTPATIENT)
Dept: FAMILY MEDICINE | Facility: OTHER | Age: 61
End: 2021-02-01
Payer: MEDICAID

## 2021-02-01 VITALS
OXYGEN SATURATION: 96 % | DIASTOLIC BLOOD PRESSURE: 64 MMHG | TEMPERATURE: 97.7 F | BODY MASS INDEX: 28.49 KG/M2 | HEIGHT: 68 IN | WEIGHT: 188 LBS | HEART RATE: 77 BPM | SYSTOLIC BLOOD PRESSURE: 100 MMHG

## 2021-02-01 DIAGNOSIS — M25.561 CHRONIC PAIN OF BOTH KNEES: ICD-10-CM

## 2021-02-01 DIAGNOSIS — M54.42 ACUTE MIDLINE LOW BACK PAIN WITH LEFT-SIDED SCIATICA: ICD-10-CM

## 2021-02-01 DIAGNOSIS — F33.2 SEVERE EPISODE OF RECURRENT MAJOR DEPRESSIVE DISORDER, WITHOUT PSYCHOTIC FEATURES (H): Primary | ICD-10-CM

## 2021-02-01 DIAGNOSIS — M48.02 SPINAL STENOSIS IN CERVICAL REGION: ICD-10-CM

## 2021-02-01 DIAGNOSIS — M25.562 CHRONIC PAIN OF BOTH KNEES: ICD-10-CM

## 2021-02-01 DIAGNOSIS — Z12.11 SCREEN FOR COLON CANCER: ICD-10-CM

## 2021-02-01 DIAGNOSIS — Z11.59 NEED FOR HEPATITIS C SCREENING TEST: ICD-10-CM

## 2021-02-01 DIAGNOSIS — G89.29 CHRONIC PAIN OF BOTH KNEES: ICD-10-CM

## 2021-02-01 DIAGNOSIS — Z11.4 SCREENING FOR HIV (HUMAN IMMUNODEFICIENCY VIRUS): ICD-10-CM

## 2021-02-01 DIAGNOSIS — F10.29 ALCOHOL DEPENDENCE WITH UNSPECIFIED ALCOHOL-INDUCED DISORDER (H): ICD-10-CM

## 2021-02-01 DIAGNOSIS — N52.8 OTHER MALE ERECTILE DYSFUNCTION: ICD-10-CM

## 2021-02-01 DIAGNOSIS — Z13.220 SCREENING FOR HYPERLIPIDEMIA: ICD-10-CM

## 2021-02-01 DIAGNOSIS — K21.00 GASTROESOPHAGEAL REFLUX DISEASE WITH ESOPHAGITIS WITHOUT HEMORRHAGE: ICD-10-CM

## 2021-02-01 DIAGNOSIS — M70.62 TROCHANTERIC BURSITIS OF LEFT HIP: ICD-10-CM

## 2021-02-01 DIAGNOSIS — F17.200 TOBACCO USE DISORDER: ICD-10-CM

## 2021-02-01 LAB
LDLC SERPL DIRECT ASSAY-MCNC: 100 MG/DL
PSA SERPL-ACNC: 0.61 UG/L (ref 0–4)

## 2021-02-01 PROCEDURE — 83721 ASSAY OF BLOOD LIPOPROTEIN: CPT | Performed by: PHYSICIAN ASSISTANT

## 2021-02-01 PROCEDURE — 99214 OFFICE O/P EST MOD 30 MIN: CPT | Performed by: PHYSICIAN ASSISTANT

## 2021-02-01 PROCEDURE — 36415 COLL VENOUS BLD VENIPUNCTURE: CPT | Performed by: PHYSICIAN ASSISTANT

## 2021-02-01 PROCEDURE — 84403 ASSAY OF TOTAL TESTOSTERONE: CPT | Mod: 90 | Performed by: PHYSICIAN ASSISTANT

## 2021-02-01 PROCEDURE — G0103 PSA SCREENING: HCPCS | Performed by: PHYSICIAN ASSISTANT

## 2021-02-01 PROCEDURE — 86803 HEPATITIS C AB TEST: CPT | Performed by: PHYSICIAN ASSISTANT

## 2021-02-01 PROCEDURE — 87389 HIV-1 AG W/HIV-1&-2 AB AG IA: CPT | Performed by: PHYSICIAN ASSISTANT

## 2021-02-01 PROCEDURE — 84270 ASSAY OF SEX HORMONE GLOBUL: CPT | Performed by: PHYSICIAN ASSISTANT

## 2021-02-01 RX ORDER — SENNOSIDES A AND B 8.6 MG/1
2 TABLET, FILM COATED ORAL
COMMUNITY
Start: 2021-01-21 | End: 2021-05-27

## 2021-02-01 RX ORDER — DULOXETIN HYDROCHLORIDE 20 MG/1
CAPSULE, DELAYED RELEASE ORAL
COMMUNITY
Start: 2020-04-24 | End: 2021-05-27

## 2021-02-01 RX ORDER — LANOLIN ALCOHOL/MO/W.PET/CERES
100 CREAM (GRAM) TOPICAL DAILY
COMMUNITY
Start: 2021-01-21

## 2021-02-01 RX ORDER — FOLIC ACID 1 MG/1
1 TABLET ORAL
COMMUNITY
Start: 2021-01-21 | End: 2021-02-19

## 2021-02-01 ASSESSMENT — MIFFLIN-ST. JEOR: SCORE: 1642.26

## 2021-02-01 ASSESSMENT — PAIN SCALES - GENERAL: PAINLEVEL: NO PAIN (0)

## 2021-02-01 ASSESSMENT — PATIENT HEALTH QUESTIONNAIRE - PHQ9
10. IF YOU CHECKED OFF ANY PROBLEMS, HOW DIFFICULT HAVE THESE PROBLEMS MADE IT FOR YOU TO DO YOUR WORK, TAKE CARE OF THINGS AT HOME, OR GET ALONG WITH OTHER PEOPLE: SOMEWHAT DIFFICULT
SUM OF ALL RESPONSES TO PHQ QUESTIONS 1-9: 9
SUM OF ALL RESPONSES TO PHQ QUESTIONS 1-9: 9

## 2021-02-01 NOTE — LETTER
February 4, 2021      Aldo Vernon  [address] [address]        Dear ,    We are writing to inform you of your test results.    All labs are within acceptable limits at this point in time.  Follow-up in 1 year advised.  No changes in medications at this point in time.    Resulted Orders   HIV Antigen Antibody Combo   Result Value Ref Range    HIV Antigen Antibody Combo Nonreactive NR^Nonreactive          Comment:      HIV-1 p24 Ag & HIV-1/HIV-2 Ab Not Detected   Hepatitis C Screen Reflex to HCV RNA Quant and Genotype   Result Value Ref Range    Hepatitis C Antibody Nonreactive NR^Nonreactive      Comment:      Assay performance characteristics have not been established for newborns,   infants, and children     PSA, screen   Result Value Ref Range    PSA 0.61 0 - 4 ug/L      Comment:      Assay Method:  Chemiluminescence using Siemens Vista analyzer   **Testosterone Free and Total FUTURE anytime   Result Value Ref Range    Testosterone Total 495 240 - 950 ng/dL      Comment:      This test was developed and its performance characteristics determined by the   [facility].   It has not been cleared or approved by the FDA. The laboratory is regulated   under CLIA as qualified to perform high-complexity testing. This test is used   for clinical purposes. It should not be regarded as investigational or for   research.      Sex Hormone Binding Globulin 48 11 - 80 nmol/L    Free Testosterone Calculated 8.12 4.7 - 24.4 ng/dL   LDL cholesterol direct   Result Value Ref Range    LDL Cholesterol Direct 100 (H) <100 mg/dL      Comment:      Above desirable:  100-129 mg/dl  Borderline High:  130-159 mg/dL  High:             160-189 mg/dL  Very high:       >189 mg/dl         If you have any questions or concerns, please call the clinic at the number listed above.       Sincerely,      Aldo Hurtado PA-C

## 2021-02-02 LAB
HCV AB SERPL QL IA: NONREACTIVE
HIV 1+2 AB+HIV1 P24 AG SERPL QL IA: NONREACTIVE

## 2021-02-02 ASSESSMENT — PATIENT HEALTH QUESTIONNAIRE - PHQ9: SUM OF ALL RESPONSES TO PHQ QUESTIONS 1-9: 9

## 2021-02-03 LAB
SHBG SERPL-SCNC: 48 NMOL/L (ref 11–80)
TESTOST FREE SERPL-MCNC: 8.12 NG/DL (ref 4.7–24.4)
TESTOST SERPL-MCNC: 495 NG/DL (ref 240–950)

## 2021-02-08 ENCOUNTER — TELEPHONE (OUTPATIENT)
Dept: FAMILY MEDICINE | Facility: OTHER | Age: 61
End: 2021-02-08

## 2021-02-08 NOTE — TELEPHONE ENCOUNTER
Spoke with patient.  Lab results given.  He is not wanting to take another medication.  But is there one of his anxiety/depression meds that could be causing sexual issues?  Should he cut back on something?    Please advise.    GREGORY AndersN, RN, PHN  [facility] [place] [facility] [place]  February 8, 2021

## 2021-02-08 NOTE — TELEPHONE ENCOUNTER
Left a message for call back. Would ask that if he does call back that his medications are reconciled today we make sure that we have a an accurate and complete list of what he is taking every single day. He does have some medications here that may be affecting erectile function. Further discussion will be based when he does give us a call back.  Electronically signed:    Aldo Zamora PA-C

## 2021-02-11 NOTE — TELEPHONE ENCOUNTER
Pt declined coming in, please offer again when checked in per prem. scheduled tonight.   Vannesa Du MA

## 2021-02-17 NOTE — PROGRESS NOTES
Assessment & Plan     Screen for colon cancer  Screening for hyperlipidemia  Has had a very good LDL about a month ago of 100.  Fit test is still pending.    Erectile dysfunction, unspecified erectile dysfunction type  Weak urinary stream  Alcohol dependence with unspecified alcohol-induced disorder (H) - historical  Patient is currently in recovery from substance abuse.  Has noted more problems with ED and would like to have further answers.  Advised that he be seen by urology to have a comprehensive evaluation of the concerns and review of medication.  We do note that some of the medications that he is on may be contributory.  Further evaluation by psych if that is the case is advised.  - folic acid (FOLVITE) 1 MG tablet; Take 1 tablet (1 mg) by mouth daily  - UROLOGY ADULT REFERRAL; Future  - Comprehensive metabolic panel    Severe episode of recurrent major depressive disorder, without psychotic features (H)  - Comprehensive metabolic panel    Return in about 4 weeks (around 3/19/2021) for recheck of current condition, if symptoms do not improve.    Aldo Zamora PA-C  [facility]    Colten Carlson is a 60 year old who presents for the following health issues     Patient here to discuss Erectile Dysfunction. Currently living [facility]. Patient states that he is doing better since being there.      History of Present Illness       He eats 0-1 servings of fruits and vegetables daily.He consumes 0 sweetened beverage(s) daily.He exercises with enough effort to increase his heart rate 9 or less minutes per day.  He exercises with enough effort to increase his heart rate 3 or less days per week.   He is taking medications regularly.     Review of Systems   Constitutional, HEENT, cardiovascular, pulmonary, GI, , musculoskeletal, neuro, skin, endocrine and psych systems are negative, except as otherwise noted.      Objective    /78 (BP Location: Right arm, Patient Position:  "Chair, Cuff Size: Adult Regular)   Pulse 74   Temp 98.3  F (36.8  C) (Temporal)   Resp 20   Ht 1.727 m (5' 8\")   Wt 89.4 kg (197 lb)   SpO2 96%   BMI 29.95 kg/m    Body mass index is 29.95 kg/m .  Physical Exam   GENERAL: healthy, alert and no distress  NECK: no adenopathy, no asymmetry, masses, or scars and thyroid normal to palpation  RESP: lungs clear to auscultation - no rales, rhonchi or wheezes  CV: regular rate and rhythm, normal S1 S2, no S3 or S4, no murmur, click or rub, no peripheral edema and peripheral pulses strong  ABDOMEN: soft, nontender, no hepatosplenomegaly, no masses and bowel sounds normal  MS: no gross musculoskeletal defects noted, no edema  NEURO:  DTRs are normal and symmetric throughout.  Cranial nerves 2-12 grossly intact.  Sensation intact to light touch.  Psychological:   Negative for memory, mood or flight of ideas at this time with appropriate affect.  Good recall of date, time and place.    No results found for this or any previous visit (from the past 24 hour(s)).          "

## 2021-02-19 ENCOUNTER — OFFICE VISIT (OUTPATIENT)
Dept: FAMILY MEDICINE | Facility: OTHER | Age: 61
End: 2021-02-19
Payer: MEDICAID

## 2021-02-19 VITALS
DIASTOLIC BLOOD PRESSURE: 78 MMHG | OXYGEN SATURATION: 96 % | TEMPERATURE: 98.3 F | SYSTOLIC BLOOD PRESSURE: 130 MMHG | BODY MASS INDEX: 29.86 KG/M2 | RESPIRATION RATE: 20 BRPM | HEIGHT: 68 IN | HEART RATE: 74 BPM | WEIGHT: 197 LBS

## 2021-02-19 DIAGNOSIS — Z12.11 SCREEN FOR COLON CANCER: ICD-10-CM

## 2021-02-19 DIAGNOSIS — N52.9 ERECTILE DYSFUNCTION, UNSPECIFIED ERECTILE DYSFUNCTION TYPE: Primary | ICD-10-CM

## 2021-02-19 DIAGNOSIS — Z13.220 SCREENING FOR HYPERLIPIDEMIA: ICD-10-CM

## 2021-02-19 DIAGNOSIS — R39.12 WEAK URINARY STREAM: ICD-10-CM

## 2021-02-19 DIAGNOSIS — F10.29 ALCOHOL DEPENDENCE WITH UNSPECIFIED ALCOHOL-INDUCED DISORDER (H): ICD-10-CM

## 2021-02-19 DIAGNOSIS — F33.2 SEVERE EPISODE OF RECURRENT MAJOR DEPRESSIVE DISORDER, WITHOUT PSYCHOTIC FEATURES (H): ICD-10-CM

## 2021-02-19 LAB
ALBUMIN SERPL-MCNC: 4.1 G/DL (ref 3.4–5)
ALP SERPL-CCNC: 88 U/L (ref 40–150)
ALT SERPL W P-5'-P-CCNC: 34 U/L (ref 0–70)
ANION GAP SERPL CALCULATED.3IONS-SCNC: 4 MMOL/L (ref 3–14)
AST SERPL W P-5'-P-CCNC: 16 U/L (ref 0–45)
BILIRUB SERPL-MCNC: 0.5 MG/DL (ref 0.2–1.3)
BUN SERPL-MCNC: 18 MG/DL (ref 7–30)
CALCIUM SERPL-MCNC: 9.5 MG/DL (ref 8.5–10.1)
CHLORIDE SERPL-SCNC: 105 MMOL/L (ref 94–109)
CO2 SERPL-SCNC: 29 MMOL/L (ref 20–32)
CREAT SERPL-MCNC: 0.99 MG/DL (ref 0.66–1.25)
GFR SERPL CREATININE-BSD FRML MDRD: 83 ML/MIN/{1.73_M2}
GLUCOSE SERPL-MCNC: 77 MG/DL (ref 70–99)
POTASSIUM SERPL-SCNC: 4.3 MMOL/L (ref 3.4–5.3)
PROT SERPL-MCNC: 7.5 G/DL (ref 6.8–8.8)
SODIUM SERPL-SCNC: 138 MMOL/L (ref 133–144)

## 2021-02-19 PROCEDURE — 80053 COMPREHEN METABOLIC PANEL: CPT | Performed by: PHYSICIAN ASSISTANT

## 2021-02-19 PROCEDURE — 36415 COLL VENOUS BLD VENIPUNCTURE: CPT | Performed by: PHYSICIAN ASSISTANT

## 2021-02-19 PROCEDURE — 99214 OFFICE O/P EST MOD 30 MIN: CPT | Performed by: PHYSICIAN ASSISTANT

## 2021-02-19 RX ORDER — FOLIC ACID 1 MG/1
1 TABLET ORAL DAILY
Qty: 100 TABLET | Refills: 3 | Status: SHIPPED | OUTPATIENT
Start: 2021-02-19

## 2021-02-19 ASSESSMENT — MIFFLIN-ST. JEOR: SCORE: 1683.09

## 2021-04-09 ENCOUNTER — TELEPHONE (OUTPATIENT)
Dept: FAMILY MEDICINE | Facility: OTHER | Age: 61
End: 2021-04-09

## 2021-04-09 NOTE — LETTER
[address]  [facility] [address]  Phone: [phone]  April 28, 2021      Aldo DIAZ [facility] RESIDENT PROGRAM  [facility] [id number]      Dear Aldo,    We care about your health and have reviewed your health plan including your medical conditions, medications, and lab results.  Based on this review, it is recommended that you follow up regarding the following health topic(s):  -Colon Cancer Screening  -Wellness (Physical) Visit     We recommend you take the following action(s):  -schedule a WELLNESS (Physical) APPOINTMENT.  We will perform the following labs: Lipids (fasting cholesterol - nothing to eat except water and/or meds for 8-10 hours).  -schedule a COLONOSCOPY to look for colon cancer (due every 10 years or 5 years in higher risk situations.)  Colonoscopies can prevent 90-95% of colon cancer deaths.  Problem lesions can be removed before they ever become cancer.  If you do not wish to do a colonoscopy or cannot afford to do one at this time, there is another option called a Fecal Immunochemical Occult Blood Test (FIT) a take home stool sample kit.  It does not replace the colonoscopy for colorectal cancer screening, but it can detect hidden bleeding in the lower colon.  It does need to be repeated every year and if a positive result is obtained, you would be referred for a colonoscopy.  If you have completed either one of these tests at another facility, please have the records sent to our clinic for our records.     Please call us at the [facility] - [phone] (or use [employer]) to address the above recommendations.     Thank you for trusting [facility] and we appreciate the opportunity to serve you.  We look forward to supporting your healthcare needs in the future.    Healthy Regards,    Your Health Care Team  [facility]

## 2021-04-09 NOTE — TELEPHONE ENCOUNTER
Patient Quality Outreach Summary      Summary:    Patient is due/failing the following:   Colonoscopy and Physical     Type of outreach:    Phone, left message for patient/parent to call back.    Questions for provider review:    None                                                                                                                    Gabriela Meeks CMA       Chart routed to Care Team.

## 2021-04-14 ENCOUNTER — TELEPHONE (OUTPATIENT)
Dept: FAMILY MEDICINE | Facility: OTHER | Age: 61
End: 2021-04-14

## 2021-04-14 DIAGNOSIS — F60.89 MIXED PERSONALITY DISORDER (H): Primary | ICD-10-CM

## 2021-04-14 NOTE — TELEPHONE ENCOUNTER
Reason for Call:  Form, our goal is to have forms completed with 72 hours, however, some forms may require a visit or additional information.    Type of letter, form or note:  medical    Who is the form from?: [facility] (if other please explain)    Where did the form come from: form was faxed in    What clinic location was the form placed at?: [facility] - [phone]    Where the form was placed: Team A Box/Folder    What number is listed as a contact on the form?: [phone]       Additional comments: Please complete form and fax to [phone]    Call taken on 4/14/2021 at 5:36 PM by Beatriz Del Toro

## 2021-04-15 RX ORDER — MULTIVITAMIN/IRON/FOLIC ACID 18MG-0.4MG
TABLET ORAL
Qty: 100 TABLET | Refills: 3 | Status: SHIPPED | OUTPATIENT
Start: 2021-04-15

## 2021-04-15 NOTE — TELEPHONE ENCOUNTER
In my MA box (overhead file over my desk) for completion and scan of the document into the medical record.  Electronically signed:    Aldo Zamora PA-C

## 2021-05-27 ENCOUNTER — HOSPITAL ENCOUNTER (EMERGENCY)
Facility: CLINIC | Age: 61
Discharge: PSYCHIATRIC OR SUBSTANCE USE FACILITY | End: 2021-05-28
Attending: EMERGENCY MEDICINE | Admitting: EMERGENCY MEDICINE
Payer: COMMERCIAL

## 2021-05-27 ENCOUNTER — AMBULATORY - HEALTHEAST (OUTPATIENT)
Dept: CARE COORDINATION | Facility: HOSPITAL | Age: 61
End: 2021-05-27

## 2021-05-27 DIAGNOSIS — F10.20 UNCOMPLICATED ALCOHOL DEPENDENCE (H): ICD-10-CM

## 2021-05-27 DIAGNOSIS — F32.A DEPRESSION, UNSPECIFIED DEPRESSION TYPE: ICD-10-CM

## 2021-05-27 DIAGNOSIS — R45.89 AT HIGH RISK FOR SUICIDE: ICD-10-CM

## 2021-05-27 LAB
LABORATORY COMMENT REPORT: NORMAL
SARS-COV-2 RNA RESP QL NAA+PROBE: NEGATIVE
SPECIMEN SOURCE: NORMAL

## 2021-05-27 PROCEDURE — 87635 SARS-COV-2 COVID-19 AMP PRB: CPT | Performed by: EMERGENCY MEDICINE

## 2021-05-27 PROCEDURE — 250N000013 HC RX MED GY IP 250 OP 250 PS 637: Performed by: EMERGENCY MEDICINE

## 2021-05-27 PROCEDURE — 90791 PSYCH DIAGNOSTIC EVALUATION: CPT

## 2021-05-27 PROCEDURE — 250N000013 HC RX MED GY IP 250 OP 250 PS 637: Performed by: PSYCHIATRY & NEUROLOGY

## 2021-05-27 PROCEDURE — 99214 OFFICE O/P EST MOD 30 MIN: CPT | Performed by: PSYCHIATRY & NEUROLOGY

## 2021-05-27 PROCEDURE — 99285 EMERGENCY DEPT VISIT HI MDM: CPT | Mod: 25

## 2021-05-27 PROCEDURE — C9803 HOPD COVID-19 SPEC COLLECT: HCPCS

## 2021-05-27 PROCEDURE — 99207 PR CONSULT E&M CHANGED TO SUBSEQUENT LEVEL: CPT | Performed by: PSYCHIATRY & NEUROLOGY

## 2021-05-27 RX ORDER — SENNOSIDES A AND B 8.6 MG/1
2 TABLET, FILM COATED ORAL AT BEDTIME
COMMUNITY

## 2021-05-27 RX ORDER — FOLIC ACID 1 MG/1
1 TABLET ORAL DAILY
Status: DISCONTINUED | OUTPATIENT
Start: 2021-05-28 | End: 2021-05-28 | Stop reason: HOSPADM

## 2021-05-27 RX ORDER — DIAZEPAM 5 MG
5 TABLET ORAL ONCE
Status: COMPLETED | OUTPATIENT
Start: 2021-05-27 | End: 2021-05-27

## 2021-05-27 RX ORDER — DULOXETIN HYDROCHLORIDE 60 MG/1
60 CAPSULE, DELAYED RELEASE ORAL DAILY
COMMUNITY

## 2021-05-27 RX ORDER — LORAZEPAM 1 MG/1
1-2 TABLET ORAL EVERY 30 MIN PRN
Status: DISCONTINUED | OUTPATIENT
Start: 2021-05-27 | End: 2021-05-28 | Stop reason: HOSPADM

## 2021-05-27 RX ORDER — QUETIAPINE FUMARATE 200 MG/1
200 TABLET, FILM COATED ORAL AT BEDTIME
COMMUNITY

## 2021-05-27 RX ORDER — ONDANSETRON 4 MG/1
4 TABLET, ORALLY DISINTEGRATING ORAL EVERY 6 HOURS PRN
Status: DISCONTINUED | OUTPATIENT
Start: 2021-05-27 | End: 2021-05-28 | Stop reason: HOSPADM

## 2021-05-27 RX ORDER — HALOPERIDOL 5 MG/ML
2.5-5 INJECTION INTRAMUSCULAR EVERY 6 HOURS PRN
Status: DISCONTINUED | OUTPATIENT
Start: 2021-05-27 | End: 2021-05-28 | Stop reason: HOSPADM

## 2021-05-27 RX ORDER — CLONIDINE HYDROCHLORIDE 0.1 MG/1
0.1 TABLET ORAL EVERY 8 HOURS
Status: DISCONTINUED | OUTPATIENT
Start: 2021-05-27 | End: 2021-05-28 | Stop reason: HOSPADM

## 2021-05-27 RX ORDER — LORAZEPAM 2 MG/ML
1-2 INJECTION INTRAMUSCULAR EVERY 30 MIN PRN
Status: DISCONTINUED | OUTPATIENT
Start: 2021-05-27 | End: 2021-05-28 | Stop reason: HOSPADM

## 2021-05-27 RX ORDER — OLANZAPINE 5 MG/1
5-10 TABLET, ORALLY DISINTEGRATING ORAL EVERY 6 HOURS PRN
Status: DISCONTINUED | OUTPATIENT
Start: 2021-05-27 | End: 2021-05-28 | Stop reason: HOSPADM

## 2021-05-27 RX ORDER — LANOLIN ALCOHOL/MO/W.PET/CERES
100 CREAM (GRAM) TOPICAL DAILY
Status: DISCONTINUED | OUTPATIENT
Start: 2021-06-04 | End: 2021-05-28 | Stop reason: HOSPADM

## 2021-05-27 RX ORDER — ACETAMINOPHEN 500 MG
1000 TABLET ORAL ONCE
Status: COMPLETED | OUTPATIENT
Start: 2021-05-27 | End: 2021-05-27

## 2021-05-27 RX ORDER — LANOLIN ALCOHOL/MO/W.PET/CERES
100 CREAM (GRAM) TOPICAL 3 TIMES DAILY
Status: DISCONTINUED | OUTPATIENT
Start: 2021-05-29 | End: 2021-05-28 | Stop reason: HOSPADM

## 2021-05-27 RX ORDER — FLUMAZENIL 0.1 MG/ML
0.2 INJECTION, SOLUTION INTRAVENOUS
Status: DISCONTINUED | OUTPATIENT
Start: 2021-05-27 | End: 2021-05-28 | Stop reason: HOSPADM

## 2021-05-27 RX ORDER — MULTIPLE VITAMINS W/ MINERALS TAB 9MG-400MCG
1 TAB ORAL DAILY
Status: DISCONTINUED | OUTPATIENT
Start: 2021-05-28 | End: 2021-05-28 | Stop reason: HOSPADM

## 2021-05-27 RX ORDER — LANOLIN ALCOHOL/MO/W.PET/CERES
200 CREAM (GRAM) TOPICAL 3 TIMES DAILY
Status: DISCONTINUED | OUTPATIENT
Start: 2021-05-27 | End: 2021-05-28 | Stop reason: HOSPADM

## 2021-05-27 RX ADMIN — CLONIDINE HYDROCHLORIDE 0.1 MG: 0.1 TABLET ORAL at 21:47

## 2021-05-27 RX ADMIN — THIAMINE HCL TAB 100 MG 200 MG: 100 TAB at 21:47

## 2021-05-27 RX ADMIN — DIAZEPAM 5 MG: 5 TABLET ORAL at 19:43

## 2021-05-27 RX ADMIN — ACETAMINOPHEN 1000 MG: 500 TABLET, FILM COATED ORAL at 21:40

## 2021-05-27 ASSESSMENT — ENCOUNTER SYMPTOMS
HEADACHES: 1
HALLUCINATIONS: 0
DYSPHORIC MOOD: 1
NERVOUS/ANXIOUS: 1

## 2021-05-27 ASSESSMENT — MIFFLIN-ST. JEOR: SCORE: 1659.27

## 2021-05-27 NOTE — ED TRIAGE NOTES
Feels like his life is a mess, does not want to be here anymore, drove 6 hours today to get here, suicidal

## 2021-05-27 NOTE — ED PROVIDER NOTES
"  History   Chief Complaint:  Suicidal     The history is provided by the patient.      Aldo Vernon is a 60 year old male with history of depression, anxiety, and alcohol dependence who presents with progressively worsening suicidal ideation. The patient is currently living in [facility], but drove to be seen at a hospital in the [facility] as his insurance coverage is here. During the drive he had thoughts of driving into [facility] or overdosing on his medications, however he did not act on this. He is addiment that he is needing admission for his suicidal thoughts today. He stopped taking his Cymbalta about 2 weeks ago. He drinks a 12 pack of beer a day, but has not drank in two days. He does report some shaking withdrawal symptoms. He does have a  and psychiatrist who knows he is coming here.    Review of Systems   Psychiatric/Behavioral: Positive for dysphoric mood and suicidal ideas.   All other systems reviewed and are negative.      Allergies:  Trazodone  Acamprosate    Medications:  Celexa  Cymbalta  Hydroxyzine  Oxtellar XR  Seroquel  Ranitidine  Senokot   Remeron    Past Medical History:    GERD  Moderate malnutrition  Spinal stenosis  ED  Trochanteric bursitis of left hip  Diastasis recti  Mixed personality disorder  PTSD  Fatty infiltration of liver  Umbilical hernia   Alcohol dependence  Depression   Anxiety  Alcoholic pancreatitis     Past Surgical History:    Patella surgery    Family History:    Mother - cancer     Social History:  Unaccompanied to the ED.     Physical Exam     Patient Vitals for the past 24 hrs:   BP Temp Temp src Pulse Resp SpO2 Height Weight   05/27/21 2021 (!) 152/93 99.1  F (37.3  C) Oral 77 18 97 % -- --   05/27/21 1824 (!) 158/78 98  F (36.7  C) Temporal 87 18 97 % 1.74 m (5' 8.5\") 86.2 kg (190 lb)       Physical Exam  Vitals: reviewed by me  [place]: [place], cooperative, and alert to conversation  Eyes: Tracking well, " clear conjunctiva BL  ENT: MMM, midline trachea.   Lungs: No tachypnea, no accessory muscle use. No respiratory distress.   CV: Rate as above  MSK: no joint effusion.  No evidence of trauma  Skin: No rash  Neuro: Clear speech and no facial droop.  No tremors.  Psych: Not RIS, no e/o AH/VH endorses suicidality with a plan.      Emergency Department Course     Laboratory:  Asymptomatic COVID19 Virus PCR by nasopharyngeal swab pending negative.    Emergency Department Course:    Reviewed:  1910 I reviewed nursing notes, vitals, past medical history and care everywhere    Assessments:  1915 I obtained history and examined the patient as noted above.     Interventions:  1943 Valium 5 mg PO  2140 Tylenol 1,000 mg PO  2147 Clonidine 0.1 mg PO  2147 Thiamine 200 mg PO    Disposition:  The patient was transferred to [facility].       Impression & Plan       Medical Decision Making:  This is a very pleasant 60-year-old male presents emergency room with what appears to be significant depression and active suicidality.  He is quite forthright with us, and tells us that he was very close to driving [place] of a dock and into [facility].  He endorses mild tremors here, and has not had any alcohol for 2 days, for this reason he was given a Valium.  He has no other medical complaints, he does seek to genuinely want help, and I do think that he needs to go to [facility] to follow with the mental health professionals over there.  At this moment he is voluntary, though I do not think that he should be leaving and he tells me that he does not want to leave.  We will plan for transfer to [facility] as above.    Covid-19  Aldo Vernon was evaluated during a global COVID-19 pandemic, which necessitated consideration that the patient might be at risk for infection with the SARS-CoV-2 virus that causes COVID-19.   Applicable protocols for evaluation were followed during the patient's care.   COVID-19 was considered as part of the patient's evaluation.  The plan for testing is:  a test was obtained during this visit.    Diagnosis:    ICD-10-CM    1. At high risk for suicide  Z91.89    2. Depression, unspecified depression type  F32.9    3. Uncomplicated alcohol dependence (H)  F10.20        Discharge Medications:  New Prescriptions    No medications on file       Scribe Disclosure:  I, Saira Forbes and Sima Riddle, am serving as a scribe at 6:47 PM on 5/27/2021 to document services personally performed by Sylvester Garza MD based on my observations and the provider's statements to me.          Sylvester Garza MD  06/10/21 4220

## 2021-05-28 ENCOUNTER — TRANSFERRED RECORDS (OUTPATIENT)
Dept: HEALTH INFORMATION MANAGEMENT | Facility: CLINIC | Age: 61
End: 2021-05-28

## 2021-05-28 ENCOUNTER — COMMUNICATION - HEALTHEAST (OUTPATIENT)
Dept: SCHEDULING | Facility: CLINIC | Age: 61
End: 2021-05-28

## 2021-05-28 VITALS
OXYGEN SATURATION: 97 % | TEMPERATURE: 99.2 F | HEIGHT: 69 IN | HEART RATE: 74 BPM | BODY MASS INDEX: 28.14 KG/M2 | SYSTOLIC BLOOD PRESSURE: 136 MMHG | WEIGHT: 190 LBS | RESPIRATION RATE: 16 BRPM | DIASTOLIC BLOOD PRESSURE: 78 MMHG

## 2021-05-28 NOTE — PLAN OF CARE
60 year old male with history of mixed personality disorder, anxiety, PTSD, alcohol dependence among others received from ED due to worsening depression and suicidal ideation. Reports wanting to drive his vehicle into [facility] or right eye on meds. Endorses SI but wants help and is glad he did not follow through with plans. Patient presents with sad affect, depressed in mood, tearful. Nursing and risk assessments completed. Assessments reviewed with LMHP and physician. Video monitoring in progress, patient informed.  Admission information reviewed with patient. Patient given a tour of [facility] and instructions on using the facility. Questions regarding [facility] addressed. Pt search completed and belongings inventoried.
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
## 2021-05-28 NOTE — PLAN OF CARE
Affect depressed. Patient agreeable to transfer.  All belongings that were brought into the hospital have been returned to patient. Escorted off the unit at 0035 accompanied by EMS.  Transfer to [facility].

## 2021-05-28 NOTE — ED PROVIDER NOTES
"ED Psychiatric EmPATH Note  [facility] Emergency Department - EmPATH Unit    Aldo Vernon MRN: [id number]   Age: 60 year old YOB: 1960     History     Chief Complaint   Patient presents with     Suicidal     The history is provided by the patient and medical records.     Aldo Vernon is a 60 year old male with PMH notable for alcohol dependence, depression and anxiety. He has a history of a past suicide attempt by overdose. He states he is suicidal again with thoughts of overdosing or [place] a [place]. He was sober for over a year living in a sober housing situation.  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 He started drinking once he got there. He has been drinking a 12 pack a day and some highballs. His last drink was 2 days ago. He denies any history of seizures but has had hallucinations before. He did get 1 mg of ativan in the ED. He has not taken his medications in 3 weeks. He was doing well while sober and taking his medications.     Past Medical History  Past Medical History:   Diagnosis Date     Gastroesophageal reflux disease      Moderate malnutrition (H) 7/21/2019     Spinal stenosis      Past Surgical History:   Procedure Laterality Date     ORTHOPEDIC SURGERY      report of torn menicus     DULoxetine (CYMBALTA) 60 MG capsule  folic acid (FOLVITE) 1 MG tablet  mirtazapine (REMERON) 15 MG tablet  Multiple Vitamins-Minerals (CENTROVITE) TABS  QUEtiapine (SEROQUEL) 200 MG tablet  senna (SENOKOT) 8.6 MG tablet  thiamine (B-1) 100 MG tablet      Allergies   Allergen Reactions     Trazodone Other (See Comments)     Acamprosate Other (See Comments)     Patient states that he experienced extreme weight gain and \"blotches all over my face\" with Campral  facial swelling and weight gain       Family History  Family History   Problem Relation Age of Onset     Cancer Mother      Social History   Social History     Tobacco Use     Smoking status: Current Every Day Smoker     Types: " "Cigarettes     Smokeless tobacco: Never Used     Tobacco comment: half pack a day   Substance Use Topics     Alcohol use: Not Currently     Comment: last drink July 17     Drug use: Not Currently      Past medical history, past surgical history, medications, allergies, family history, and social history were reviewed with the patient. No additional pertinent items.       Review of Systems   Neurological: Positive for headaches.   Psychiatric/Behavioral: Positive for dysphoric mood and suicidal ideas. Negative for hallucinations and self-injury. The patient is nervous/anxious.      A complete review of systems was performed with pertinent positives and negatives noted in the HPI, and all other systems negative.    Physical Examination   BP: (!) 158/78  Pulse: 87  Temp: 98  F (36.7  C)  Resp: 18  Height: 174 cm (5' 8.5\")  Weight: 86.2 kg (190 lb)  SpO2: 97 %    Physical Exam  General:  Appears stated age.   Neuro: alert and fully oriented. CN II-XII grossly intact. Grossly normal strength in all extremities.   Integumentary/Skin: no rash visualized, normal color    Psychiatric Examination   Appearance: awake, alert  Attitude:  cooperative  Eye Contact:  good  Mood:  depressed  Affect:  appropriate and in normal range  Speech:  clear, coherent  Psychomotor Behavior:  no evidence of tardive dyskinesia, dystonia, or tics  Throught Process:  logical, linear and goal oriented  Associations:  no loose associations  Thought Content:  no evidence of psychotic thought and active suicidal ideation present  Insight:  fair  Judgement:  fair  Oriented to:  time, person, and place  Attention Span and Concentration:  intact  Recent and Remote Memory:  intact    ED Course        Labs Ordered and Resulted from Time of ED Arrival Up to the Time of Departure from the ED   SARS-COV-2 (COVID-19) VIRUS RT-PCR   DRUG ABUSE SCREEN 77 URINE (FL, RH, SH)   CIWA-AR SCALE AND VS   NOTIFY PROVIDER       Assessments & Plan (with Medical Decision " Making)   Patient presenting with drinking for 3 weeks without taking medications causing suicidal thoughts with plans. Nursing notes reviewed.     I have reviewed the DEC assessment complete by Alma Delia dated 5/27/21.    Aldo will be admitted to the hospital to station 2800 at [facility] for his worsening depression and suicidal thoughts with a plan.  He has a history of attempts and relapsed on alcohol. He has only mild withdrawal and has not had a drink in over 48 hours. He was put on a CIWA protocol in the EmPATH but is low risk at this time for worsening withdrawals.      Preliminary diagnosis:  MDD severe with suicidal thoughts  R/o personality disorder  Alcohol use disorder moderate    --  Jesus Mehta MD   [facility] EMERGENCY DEPT  EmPATH Unit  5/27/2021        Jesus Mehta MD  05/27/21 [id number]

## 2021-05-28 NOTE — PHARMACY-ADMISSION MEDICATION HISTORY
Pharmacy Medication History  Admission medication history interview status for the 5/27/2021  admission is complete. See EPIC admission navigator for prior to admission medications     Location of Interview: Patient room  Medication history sources: Patient, Surescripts and Care Everywhere    In the past week, patient estimated taking medication this percent of the time: Less than 50%.    Additional medication history information:   Patient says it's been a few weeks since he has had any of his medications.    Medication reconciliation completed by provider prior to medication history? No    Time spent in this activity: 20 minutes    Prior to Admission medications    Medication Sig Last Dose Taking? Auth Provider   DULoxetine (CYMBALTA) 60 MG capsule Take 60 mg by mouth daily Past Month at Unknown time Yes Unknown, Entered By History   folic acid (FOLVITE) 1 MG tablet Take 1 tablet (1 mg) by mouth daily Past Month at Unknown time Yes Aldo Hurtado PA-C   mirtazapine (REMERON) 15 MG tablet Take 15 mg by mouth At Bedtime Past Month at Unknown time Yes Reported, Patient   Multiple Vitamins-Minerals (CENTROVITE) TABS TAKE 1 TABLET BY MOUTH ONCE DAILY. Past Month at Unknown time Yes Aldo Hurtado PA-C   QUEtiapine (SEROQUEL) 200 MG tablet Take 200 mg by mouth At Bedtime Past Month at Unknown time Yes Unknown, Entered By History   thiamine (B-1) 100 MG tablet Take 100 mg by mouth daily Past Month at Unknown time Yes Reported, Patient   Senna 8.6 mg tablets Take 2 tablets by mouth at bedtime. Past month         The information provided in this note is only as accurate as the sources available at the time of update(s)

## 2021-06-06 ENCOUNTER — COMMUNICATION - HEALTHEAST (OUTPATIENT)
Dept: SCHEDULING | Facility: CLINIC | Age: 61
End: 2021-06-06

## 2021-06-25 NOTE — PROGRESS NOTES
S: Rao LAGUNA called at 2128 to place a 59 y/o male for inpatient mental health treatment.    B: Aldo Vernon is a 60 year old male with PMH notable for alcohol dependence, depression and anxiety. He has a history of a past suicide attempt by overdose. He states he is suicidal again with thoughts of overdosing or [place]. He was sober for over a year living in a sober housing situation.  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 He has been drinking a 12 pack a day and some highballs. His last drink was 2 days ago. He denies any history of seizures but has had hallucinations before. He did get 1 mg of ativan in the ED. He has not taken his medications in 3 weeks. He was doing well while sober and taking his medications. He is negative for COVID. Utox is pending. Medicaly cleared    A: Voluntary.    R: Dr. Mccracken approved admission to [facility] at 2211. Unit notified at 2218. [facility] unit notified at 2222.

## 2022-02-11 VITALS
TEMPERATURE: 98.1 F | HEART RATE: 100 BPM | WEIGHT: 201 LBS | HEIGHT: 67 IN | SYSTOLIC BLOOD PRESSURE: 124 MMHG | BODY MASS INDEX: 31.55 KG/M2 | DIASTOLIC BLOOD PRESSURE: 80 MMHG

## 2022-02-11 VITALS
BODY MASS INDEX: 32.92 KG/M2 | SYSTOLIC BLOOD PRESSURE: 157 MMHG | DIASTOLIC BLOOD PRESSURE: 91 MMHG | HEART RATE: 77 BPM | WEIGHT: 205.2 LBS | WEIGHT: 210.2 LBS | HEART RATE: 74 BPM | DIASTOLIC BLOOD PRESSURE: 90 MMHG | TEMPERATURE: 97.5 F | BODY MASS INDEX: 32.21 KG/M2 | TEMPERATURE: 98.3 F | HEIGHT: 67 IN | SYSTOLIC BLOOD PRESSURE: 145 MMHG

## 2022-02-11 VITALS
DIASTOLIC BLOOD PRESSURE: 82 MMHG | TEMPERATURE: 98.4 F | BODY MASS INDEX: 31.55 KG/M2 | HEART RATE: 98 BPM | SYSTOLIC BLOOD PRESSURE: 138 MMHG | HEIGHT: 67 IN | WEIGHT: 201 LBS

## 2022-02-16 NOTE — PROGRESS NOTES
Patient:   MARYAN ARORA            MRN: [id number]            FIN: [id number]               Age:   56 years     Sex:  Male     :  1960   Associated Diagnoses:   None   Author:   Semaj Avitia MD      Procedure   EKG procedure   Date:  2017.     EKG findings   Interpretation: Semaj Avitia MD.     Interpretation: normal EKG.

## 2022-02-16 NOTE — LETTER
(Inserted Image. Unable to display)   March 13, 2019        MARYAN ARORA  [address] [place], [place] [id number]        Dear MARYAN,      Thank you for selecting [facility] ([facility]viously [facility]) for your healthcare needs.    Our records indicate you are due for the following services:     Follow-up office visit     To schedule an appointment or if you have further questions, please contact your primary clinic:   [facility]       [phone]   [facility]       [phone]              [facility]     [phone]      Powered by [employer]    Sincerely,    Piero Durand M.D.

## 2022-02-16 NOTE — PROGRESS NOTES
Patient:   MARYAN ARORA            MRN: [id number]            FIN: [id number]               Age:   56 years     Sex:  Male     :  1960   Associated Diagnoses:   Preop testing; Acute meniscal tear of right knee   Author:   Semaj Avitia MD      Preoperative Information   Sustained meniscal tear a couple weeks ago. Having arthroscopic surgery on the R knee      Chief Complaint   2017 3:17 PM CDT    Preop. DOS 17 with Dr. Rojas at [facility]. Fax [phone]      Review of Systems       - never had general anesthesia, no FH of problems with anesthesia  - not taking NSAIDs currently. Has been taking tramadol.  - no blood clots  - good functional status (4 blocks, 2 flights of stairs) prior to injury   Constitutional:  No fever, No chills, No fatigue.    Eye:  No recent visual problem, No double vision.    Ear/Nose/Mouth/Throat:  No nasal congestion     Decreased hearing: On the left.    Respiratory:  No shortness of breath, No cough.    Cardiovascular:  No chest pain, No palpitations, No peripheral edema, No syncope.    Gastrointestinal:  No nausea, No vomiting, No diarrhea, No constipation.    Genitourinary:  Change in urine stream, l;ower urinary tract sytems noted likely related to prostate.    Hematology/Lymphatics:  No bruising tendency, No bleeding tendency.    Neurologic:  No numbness, No tingling, No headache.       Health Status   Allergies:    Allergic Reactions (Selected)  Severity Not Documented  TraZODone (Hives and dizzy spells)   Medications:  (Selected)   Prescriptions  Prescribed  DULoxetine 60 mg oral delayed release capsule: 1 cap(s) ( 60 mg ), po, daily, # 30 cap(s), 3 Refill(s), Type: Maintenance, Pharmacy: [id number], 1 cap(s) po daily  MiraLax oral powder for reconstitution: ( 17 gm ), po, daily, # 12 EA, 0 Refill(s), Type: Maintenance, Pharmacy: [facility], 17 gm po daily,x14 day(s)  QUEtiapine 100 mg oral tablet: 1 tab(s) ( 100 mg ), po, qhs, #  30 tab(s), 3 Refill(s), Type: Maintenance, Pharmacy: [facility], 1 tab(s) po qhs,x30 day(s)  mirtazapine 15 mg oral tablet: 1 tab(s) ( 15 mg ), po, hs, # 30 tab(s), 3 Refill(s), Type: Maintenance, Pharmacy: [facility] [address], 1 tab(s) po hs  pantoprazole 40 mg oral delayed release tablet: 1 tab(s) ( 40 mg ), PO, Daily, # 30 tab(s), 3 Refill(s), Type: Maintenance, Pharmacy: [facility], 1 tab(s) po daily   Problem list:    All Problems  Obesity / SNOMED CT 9846223864 / Probable  Tobacco user / SNOMED CT 031519701 / Probable      Histories   Past Medical History:    No active or resolved past medical history items have been selected or recorded.   Family History:    Leukemia  Daughter (Gisele)     Procedure history:    Colonoscopy (504909591) on 6/6/2017 at 56 Years.  Comments:  6/13/2017 2:11 PM - Apple Stewart RN  Indication: Screening  Sedation: Fentanyl and versed  Findings: Normal colonoscopy  F/U: Repeat colonoscopy in 10 years   Social History:        Alcohol Assessment            Current, Daily      Tobacco Assessment            Current every day smoker, Cigarettes, 10 per day.  25 year(s).      Substance Abuse Assessment            Past, Marijuana      Employment and Education Assessment            Employed, Work/School description: / .      Home and Environment Assessment            Marital status: .  Risks in environment: Unlocked guns.      Nutrition and Health Assessment            Type of diet: Regular.      Exercise and Physical Activity Assessment            Exercise frequency: Daily.  Exercise type: Walking.      Sexual Assessment            Sexually active: No.  Sexual orientation: Heterosexual.        Physical Examination   Vital Signs   6/13/2017 3:17 PM CDT Temperature Tympanic 98.4 DegF    Peripheral Pulse Rate 98 bpm    Systolic Blood Pressure 138 mmHg    Diastolic Blood Pressure 82 mmHg    Mean Arterial Pressure 101 mmHg       Measurements from flowsheet : Measurements   6/13/2017 3:17 PM CDT Height Measured - Standard 67 in    Weight Measured - Standard 201 lb    BSA 2.07 m2    Body Mass Index 31.48 kg/m2      General:  Alert and oriented, No acute distress.    Eye:  Pupils are equal, round and reactive to light, Normal conjunctiva.    HENT:  Tympanic membranes are clear, Oral mucosa is moist, narrow airway.    Neck:  Supple, Non-tender, No lymphadenopathy.    Respiratory:  Lungs are clear to auscultation, Respirations are non-labored.    Cardiovascular:  Normal rate, Regular rhythm, No murmur.    Gastrointestinal:  Soft, Non-tender, Non-distended, Normal bowel sounds.    Musculoskeletal:  gait antalgic.    Integumentary:  Warm, Dry, No rash.    Neurologic:  Alert, Oriented, Normal motor function, No focal deficits.    Psychiatric:  Cooperative, Appropriate mood & affect.       Health Maintenance      Recommendations     Pending (in the next year)        Due            Aspirin Therapy for Prevention of CVD (Male) due  06/13/17  and every 5  year(s)           HIV Screen (if sexually active) (Male) due  06/13/17  and every 1  year(s)           Influenza Vaccine due  06/13/17  and every 1  year(s)           Lipid Disorders Screen (Male) due  06/13/17  and every 1  year(s)           Lung Cancer Screen (Male) due  06/13/17  and every 1  year(s)           STD Counseling (if sexually active) (Male) due  06/13/17  and every 1  year(s)           Syphilis Screen (if sexually active) (Male) due  06/13/17  and every 1  year(s)           Tetanus Vaccine due  06/13/17  and every 10  year(s)           Type 2 Diabetes Mellitus Screen (Male) due  06/13/17  Variable frequency        Due In Future            Alcohol Misuse Screen (Male) not due until  04/25/18  and every 1  year(s)           Depression Screen (Male) not due until  04/25/18  and every 1  year(s)     Satisfied (in the past 1 year)        Satisfied            Alcohol Misuse Screen (Male) on   04/25/17.           Body Mass Index Check (Male) on  06/13/17.           Body Mass Index Check (Male) on  04/25/17.           Colorectal Cancer Screen (Colonoscopy) (Male) on  06/06/17.           Colorectal Cancer Screen (Occult Blood) (Male) on  06/06/17.           Colorectal Cancer Screen (Sigmoidoscopy) (Male) on  06/06/17.           Depression Screen (Male) on  04/25/17.           Depression Screen (Male) on  04/25/17.           Depression Screen (Male) on  04/25/17.           High Blood Pressure Screen (Male) on  06/13/17.           High Blood Pressure Screen (Male) on  04/25/17.           Obesity Screen and Counseling (Male) on  06/13/17.           Obesity Screen and Counseling (Male) on  04/25/17.        Canceled            Colorectal Cancer Screen (Occult Blood) (Male) on  06/13/17. Reason: Other           Colorectal Cancer Screen (Sigmoidoscopy) (Male) on  06/13/17. Reason: Other        Review / Management   Results review:  Lab results   4/25/2017 4:35 PM CDT Sodium Level 137 mmol/L    Potassium Level 4.1 mmol/L    Chloride Level 101 mmol/L    CO2 Level 28 mmol/L    Glucose Level 102 mg/dL  HI    BUN 14 mg/dL    Creatinine 1.04 mg/dL    BUN/Creat Ratio NOT APPLICABLE    eGFR 80 mL/min/1.73m2    eGFR African American 93 mL/min/1.73m2    Calcium Level 10.3 mg/dL    Bili Total 0.4 mg/dL    Alk Phos 94 unit/L    AST/SGOT 17 unit/L    ALT/SGPT 37 unit/L    Protein Total 7.7 gm/dL    Albumin Level 4.9 gm/dL    Globulin 2.8    A/G Ratio 1.8    WBC 7.9    RBC 5.18    Hgb 15.8 gm/dL    Hct 46.6 %    MCV 89.9 fL    MCH 30.6 pg    MCHC 34.0 gm/dL    RDW 13.0 %    Platelet 278    MPV 9.5 fL    PT 10.6    INR 1.0   .    ECG interpretation:  Within normal limits.       Impression and Plan   Diagnosis     Preop testing (YZV34-PF Z01.818).     Acute meniscal tear of right knee (NKC69-DJ S83.206A).     Condition:  Stable.        Pt optimized for surgery. He is healthy except for some depression and insomnia and GERD. He  likely has MAGDA.  He is gonig to quit smoking now.   He may continue his meds as usual in the perioperative period    We will have him come back for a hearing screen.
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
## 2022-02-16 NOTE — PROGRESS NOTES
"   Patient:   MARYAN ARORA            MRN: [id number]            FIN: [id number]               Age:   56 years     Sex:  Male     :  1960   Associated Diagnoses:   None   Author:   Semaj Avitia MD      Visit Information      Date of Service: 2017 03:08 pm  Performing Location: [facility]  Encounter#: [id number]      Primary Care Provider (PCP):  Not recorded.      Referring Provider:  No referring provider recorded for selected visit.      Chief Complaint   2017 3:18 PM CDT    Pt here to [facility]. Pt c/o pain on right side due to his \"fatty liver\". Has not had a drink of alcohol in 90 days.        History of Present Illness   Pt has a history of alcoholsim for 30 years. Moved to [facility] [place] recently and is residing at [facility] house here. Has been sober for 90 days. Been feeling good mood-wise. He neds refills of cymbalta, mirtazapine, seroquel, and protonix  Several concerns. He has a history of a year of left hip pain. He was seen at [facility] for this and states he had XR which did not show arthritis and was told by an orthopedic surgeon he had bursitis and needed an injection. It continues to bother him. Gait is basically unaffected.  He complains of intermittend R side pain that lasts for a few days and then resolves.  He notes diastis recti      Review of Systems   Constitutional:  No fever.    Eye:  Negative except as documented in history of present illness.    Ear/Nose/Mouth/Throat:  Negative except as documented in history of present illness.    Respiratory:  Negative except as documented in history of present illness.    Cardiovascular:  Negative except as documented in history of present illness.    Gastrointestinal:  Constipation, Heartburn, No nausea, No vomiting, No diarrhea.    Genitourinary:  Negative except as documented in history of present illness.    Hematology/Lymphatics:  Negative except as documented in history of present illness.  "   Endocrine:  Negative except as documented in history of present illness.    Immunologic:  Negative except as documented in history of present illness.    Musculoskeletal:  Negative except as documented in history of present illness.    Integumentary:  Negative except as documented in history of present illness.    Neurologic:  Negative except as documented in history of present illness.    Psychiatric:  Negative except as documented in history of present illness.             Health Status   Allergies:    Allergic Reactions (Selected)  Severity Not Documented  TraZODone (Hives and dizzy spells)   Medications:  (Selected)   Prescriptions  Prescribed  DULoxetine 60 mg oral delayed release capsule: 1 cap(s) ( 60 mg ), po, daily, # 30 cap(s), 3 Refill(s), Type: Maintenance, Pharmacy: [facility] [id number], 1 cap(s) po daily  MiraLax oral powder for reconstitution: ( 17 gm ), po, daily, # 12 EA, 0 Refill(s), Type: Maintenance, Pharmacy: [facility] [id number], 17 gm po daily,x14 day(s)  QUEtiapine 100 mg oral tablet: 1 tab(s) ( 100 mg ), po, qhs, # 30 tab(s), 3 Refill(s), Type: Maintenance, Pharmacy: [facility] [id number], 1 tab(s) po qhs,x30 day(s)  mirtazapine 15 mg oral tablet: 1 tab(s) ( 15 mg ), po, hs, # 30 tab(s), 3 Refill(s), Type: Maintenance, Pharmacy: [facility] [id number], 1 tab(s) po hs  pantoprazole 40 mg oral delayed release tablet: 1 tab(s) ( 40 mg ), PO, Daily, # 30 tab(s), 3 Refill(s), Type: Maintenance, Pharmacy: [facility] [id number], 1 tab(s) po daily  Documented Medications  Documented  folic acid 1 mg oral tablet: 1 tab(s) ( 1 mg ), po, daily, 0 Refill(s), Type: Maintenance   Problem list:    All Problems  Obesity / SNOMED CT 3776873645 / Probable      Histories   Past Medical History:    No active or resolved past medical history items have been selected or recorded.   Family History:    No family history items have been selected or recorded.   Procedure history:    No  active procedure history items have been selected or recorded.   Social History:             No active social history items have been recorded.      Physical Examination   Vital Signs   4/25/2017 3:18 PM CDT Temperature Tympanic 98.1 DegF    Peripheral Pulse Rate 100 bpm    Systolic Blood Pressure 124 mmHg    Diastolic Blood Pressure 80 mmHg    Mean Arterial Pressure 95 mmHg      Measurements from flowsheet : Measurements   4/25/2017 3:18 PM CDT Height Measured - Standard 67 in    Weight Measured - Standard 201 lb    BSA 2.07 m2    Body Mass Index 31.48 kg/m2      Eye:  Pupils are equal, round and reactive to light.    Respiratory:  Lungs are clear to auscultation.    Cardiovascular:  Normal rate, Regular rhythm.    Gastrointestinal:  Soft, Non-tender, Non-distended, Normal bowel sounds.    Musculoskeletal:  Normal gait, tenderness over greater trochanter.       Impression and Plan     L trochanteric bursitis   - informed consent obtained, area was prepped w iodine. 40 mg of kenalog and 4 cc of 1% lidocaine were mixed. needle was introduced at th epoint of maximum tenderness until bone was felt and then withdrawn slightly and the entire solution was easily injected. He tolerated the procedure well.    Abdominal pain  - likely related to constipation, will check some basic labs and start miralax    depression  - continue cymbalta, remeron, seroquel at night for sleep    gerd  - continue protonix

## 2022-02-16 NOTE — PROGRESS NOTES
Patient:   MARYAN ARORA            MRN: [id number]            FIN: [id number]               Age:   57 years     Sex:  Male     :  1960   Associated Diagnoses:   Well adult exam; Alcohol abuse; Bursitis   Author:   Piero Durand MD      Visit Information      Date of Service: 2018 09:28 am  Performing Location: [facility]  Encounter#: [id number]      Primary Care Provider (PCP):  NONE ,       Referring Provider:  Piero Durand MD    NPI# [id number]      Chief Complaint   2018 9:39 AM CDT    Px   Routine Health Care Visit      History of Present Illness   Patient is here for exam.  He is currently living in a [facility] house.  Last drink was 3 months ago.  Hoping he can finally stay sober.  He is a chronic alcoholic.  Went through IP treatment recently.  Is on medications as listed which were adjusted during his treatment.  He is  from his wife.  Lost a daughter in her 20s to leukemia.  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  He is trying to get his life back together now.  He has battled chronic pancreatitis.  Also has some chronic degenerative problems with his cervical spine and sciatica and some chronic bursitis in his left hip like to look at some therapy.            Review of Systems   Constitutional:  Negative.    Eye:  Negative.    Ear/Nose/Mouth/Throat:  Negative.    Respiratory:  Negative.    Cardiovascular:  Negative.    Gastrointestinal:  Negative.    Genitourinary:  Negative.    Hematology/Lymphatics:  Negative.    Endocrine:  Negative.    Immunologic:  Negative.    Musculoskeletal:  Negative except as documented in history of present illness.    Integumentary:  Negative.    Neurologic:  Negative.    Psychiatric:  Negative.    All other systems reviewed and negative      Health Status   Allergies:    Allergic Reactions (Selected)  Severity Not Documented  Campral (Rash and weight gain)  TraZODone (Hives and dizzy spells)    Medications:  (Selected)   Prescriptions  Prescribed  DULoxetine 60 mg oral delayed release capsule: = 1 cap(s) ( 60 mg ), po, daily, # 30 cap(s), 6 Refill(s), Type: Maintenance, Pharmacy: [facility] [id number], 1 cap(s) Oral daily  MiraLax oral powder for reconstitution: ( 17 gm ), po, daily, # 12 EA, 0 Refill(s), Type: Maintenance, Pharmacy: [facility] [id number], 17 gm po daily,x14 day(s)  Remeron 45 mg oral tablet: = 1 tab(s) ( 45 mg ), Oral, hs, # 30 tab(s), 6 Refill(s), Type: Maintenance, Pharmacy: [facility] [id number], 1 tab(s) Oral hs  SEROquel 50 mg oral tablet: = 3 tab(s) ( 150 mg ), Oral, hs, # 90 tab(s), 6 Refill(s), Type: Maintenance, Pharmacy: [facility] [id number], 3 tab(s) Oral hs  Zantac 150 oral tablet: = 1 tab(s) ( 150 mg ), Oral, daily, # 90 tab(s), 3 Refill(s), Type: Maintenance, Pharmacy: [facility] [id number], 1 tab(s) Oral daily,    Medications          *denotes recorded medication          DULoxetine 60 mg oral delayed release capsule: 60 mg, 1 cap(s), po, daily, 30 cap(s), 6 Refill(s).          SEROquel 50 mg oral tablet: 150 mg, 3 tab(s), Oral, hs, 90 tab(s), 6 Refill(s).          Remeron 45 mg oral tablet: 45 mg, 1 tab(s), Oral, hs, 30 tab(s), 6 Refill(s).          MiraLax oral powder for reconstitution: 17 gm, po, daily, for 14 day(s), 12 EA, 0 Refill(s).          Zantac 150 oral tablet: 150 mg, 1 tab(s), Oral, daily, 90 tab(s), 3 Refill(s).     Problem list:    All Problems  Obesity / SNOMED CT 6457865724 / Probable  Tobacco user / SNOMED CT 300794326 / Probable      Histories   Past Medical History:    No active or resolved past medical history items have been selected or recorded.   Family History:    Daughter: Gisele    Leukemia  Sister    History is negative.  Brother    History is negative.  Brother    History is negative.  Brother    History is negative.     Procedure history:    Colonoscopy (SNOMED CT 878288498) performed by Bryan Ford MD on  6/6/2017 at 56 Years.  Comments:  6/13/2017 2:11 PM - Apple Stewart RN  Indication: Screening  Sedation: Fentanyl and versed  Findings: Normal colonoscopy  F/U: Repeat colonoscopy in 10 years   Social History:        Alcohol Assessment            Current, Daily      Tobacco Assessment            Current every day smoker, Cigarettes, 10 per day.  25 year(s).      Substance Abuse Assessment            Past, Marijuana      Employment and Education Assessment            Employed, Work/School description: / .      Home and Environment Assessment            Marital status: .  Risks in environment: Unlocked guns.      Nutrition and Health Assessment            Type of diet: Regular.      Exercise and Physical Activity Assessment            Exercise frequency: Daily.  Exercise type: Walking.      Sexual Assessment            Sexually active: No.  Sexual orientation: Heterosexual.        Physical Examination   Vital Signs   9/12/2018 9:39 AM CDT Temperature Tympanic 97.5 DegF  LOW    Peripheral Pulse Rate 74 bpm    HR Method Electronic    Systolic Blood Pressure 145 mmHg  HI    Diastolic Blood Pressure 90 mmHg  HI    Mean Arterial Pressure 108 mmHg    BP Method Electronic      Measurements from flowsheet : Measurements   9/12/2018 9:39 AM CDT Height Measured - Standard 67 in    Weight Measured - Standard 205.2 lb    BSA 2.1 m2    Body Mass Index 32.14 kg/m2  HI      General:  Alert and oriented.    Eye:  Pupils are equal, round and reactive to light, Normal conjunctiva.    HENT:  Normocephalic, Tympanic membranes are clear, Oral mucosa is moist.    Neck:  Supple, Non-tender, No lymphadenopathy, No thyromegaly.    Respiratory:  Breath sounds are equal, Symmetrical chest wall expansion.         Respirations: Are within normal limits.         Pattern: Regular.         Breath sounds: Bilateral, Within normal limits.    Cardiovascular:  Normal rate, Regular rhythm, No murmur, Good pulses equal in  all extremities, Normal peripheral perfusion, No edema.    Gastrointestinal:  Soft, Non-tender, Non-distended, Normal bowel sounds.    Musculoskeletal:  No tenderness, No swelling.    Integumentary:  Warm, Dry.    Neurologic:  No focal deficits.    Cognition and Speech:  Oriented, Speech clear and coherent.    Psychiatric:  Appropriate mood & affect, Normal judgment.       Health Maintenance      Recommendations     Pending (in the next year)        OverDue           Alcohol Misuse Screen (Male) due  04/25/18  and every 1  year(s)           Depression Screen (Male) due  04/25/18  and every 1  year(s)           Body Mass Index Check (Male) due  06/13/18  and every 1  year(s)           High Blood Pressure Screen (Male) due  06/13/18  and every 1  year(s)           Obesity Screen and Counseling (Male) due  06/13/18  and every 1  year(s)        Due            Aspirin Therapy for Prevention of CVD (Male) due  09/12/18  and every 5  year(s)           HIV Screen (if sexually active) (Male) due  09/12/18  and every 1  year(s)           Hepatitis C Screen 2798-2009 (Male) due  09/12/18  One-time only           Influenza Vaccine due  09/12/18  and every 1  year(s)           Lipid Disorders Screen (Male) due  09/12/18  and every 1  year(s)           Lung Cancer Screen (Male) due  09/12/18  and every 1  year(s)           STD Counseling (if sexually active) (Male) due  09/12/18  and every 1  year(s)           Syphilis Screen (if sexually active) (Male) due  09/12/18  and every 1  year(s)           Tetanus Vaccine due  09/12/18  and every 10  year(s)           Type 2 Diabetes Mellitus Screen (Male) due  09/12/18  Variable frequency     Satisfied (in the past 1 year)     There are no satisfied recommendations within the defined date range          Impression and Plan   Diagnosis     Well adult exam (RVD15-BV Z00.00).     Alcohol abuse (AUL50-FB F10.10).     Bursitis (TFL02-OX M71.9).     Course:  Progressing as expected.    Plan:   hcm reviewed, We will set up physical therapy.  We will get his old records.  You his meds renewed for 6 months.  Track his blood pressures as they are elevated.  .    Patient Instructions:       Counseled: Patient, Diet, Activity, Verbalized understanding.    Counseled:  Patient, Routine exercise and healthy weight maintenance discussed.    Patient Instructions:  Return to clinic in one year for next routine health visit, or sooner if problems or concerns.

## 2022-02-17 PROBLEM — K21.9 GASTROESOPHAGEAL REFLUX DISEASE: Status: ACTIVE | Noted: 2019-08-28

## 2022-09-14 NOTE — ED NOTES
Central Prior Authorization Team   Phone: [phone]      PA Initiation    Medication: Daridorexant HCl (QUVIVIQ) 25 MG TABS--INITIATED  Insurance Company: [employer] - Phone [phone] Fax [phone]  Pharmacy Filling the Rx: [id number] - [place], [place] - [address] [place] [address] [address] [facility]  Filling Pharmacy Phone: [phone]  Filling Pharmacy Fax:    Start Date: 9/14/2022       Video Observation initiated, patient informed. Pt changed into scrubs and belongings secured per unit protocol.     Deepti Durham RN

## 2023-01-23 NOTE — TELEPHONE ENCOUNTER
Pending Prescriptions:                       Disp   Refills    Multiple Vitamins-Minerals (CENTROVITE) TA*30 tab*         Sig: TAKE 1 TABLET BY MOUTH ONCE DAILY.      Routing refill request to provider for review/approval because:  Drug not active on patient's medication list    Litzy Andrews RN on 4/15/2021 at 11:52 AM     Patent